# Patient Record
Sex: MALE | Race: WHITE | Employment: UNEMPLOYED | URBAN - METROPOLITAN AREA
[De-identification: names, ages, dates, MRNs, and addresses within clinical notes are randomized per-mention and may not be internally consistent; named-entity substitution may affect disease eponyms.]

---

## 2017-09-20 ENCOUNTER — OFFICE VISIT (OUTPATIENT)
Dept: ORTHOPEDIC SURGERY | Age: 42
End: 2017-09-20

## 2017-09-20 VITALS
WEIGHT: 205 LBS | BODY MASS INDEX: 27.77 KG/M2 | HEART RATE: 63 BPM | SYSTOLIC BLOOD PRESSURE: 140 MMHG | DIASTOLIC BLOOD PRESSURE: 85 MMHG | HEIGHT: 72 IN

## 2017-09-20 DIAGNOSIS — M22.41 CHONDROMALACIA PATELLA, RIGHT: ICD-10-CM

## 2017-09-20 DIAGNOSIS — M25.561 RIGHT KNEE PAIN, UNSPECIFIED CHRONICITY: Primary | ICD-10-CM

## 2017-09-20 DIAGNOSIS — Z98.890 S/P ARTHROSCOPY OF KNEE: ICD-10-CM

## 2017-09-20 DIAGNOSIS — M17.10 LOCALIZED OSTEOARTHROSIS, LOWER LEG: ICD-10-CM

## 2017-09-20 PROCEDURE — 73562 X-RAY EXAM OF KNEE 3: CPT | Performed by: ORTHOPAEDIC SURGERY

## 2017-09-20 PROCEDURE — 20611 DRAIN/INJ JOINT/BURSA W/US: CPT | Performed by: ORTHOPAEDIC SURGERY

## 2017-09-20 PROCEDURE — 99999 PR OFFICE/OUTPT VISIT,PROCEDURE ONLY: CPT | Performed by: ORTHOPAEDIC SURGERY

## 2017-09-22 ENCOUNTER — TELEPHONE (OUTPATIENT)
Dept: ORTHOPEDIC SURGERY | Age: 42
End: 2017-09-22

## 2017-10-25 ENCOUNTER — NURSE ONLY (OUTPATIENT)
Dept: ORTHOPEDIC SURGERY | Age: 42
End: 2017-10-25

## 2017-10-25 DIAGNOSIS — M17.11 PRIMARY OSTEOARTHRITIS OF RIGHT KNEE: Primary | ICD-10-CM

## 2017-10-25 PROCEDURE — 99999 PR OFFICE/OUTPT VISIT,PROCEDURE ONLY: CPT | Performed by: PHYSICIAN ASSISTANT

## 2017-10-25 PROCEDURE — 20611 DRAIN/INJ JOINT/BURSA W/US: CPT | Performed by: PHYSICIAN ASSISTANT

## 2017-10-25 NOTE — PROGRESS NOTES
The patient is symptomatic from osteoarthritis of the right knee joint with documented radiological signs of osteoarthritis. The patient has also failed 3 months of conservative treatment including home exercise, education, Tylenol and/or NSAIDs use. The patient was offered a Visco supplementation today. Risks, benefits, and alternatives to the injections were discussed in detail with the patient. The risks discussed included but are not limited to infection, skin reactions, hot swollen joints, and anaphylaxis. The patient gave verbal informed consent for the injection. The patient's skin was prepped with  3 sterile gauze  pads soaked with alcohol solution and the knee joint was injected with 2cc Euflexxa intra-articularly under sterile conditions. Technique: Under sterile conditions a Son[employer] ultrasound unit with a variable frequency (6.0-15.0 MHz) linear transducer was used to localize the placement of a 22-gauge needle into the right knee joint. Findings: Successful needle placement for intra-articular Visco supplementation injection. Final images were taken and saved for permanent record. The patient tolerated the injection reasonably well. The patient was given instructions to ice the knee and avoid strenuous activities for 24-48 hours. The patient was instructed to call the office immediately if there is increased pain, redness, warmth, fever, or chills. We will see the patient back in one week for their second injection. Karlee [facility]    This dictation was performed with a verbal recognition program [facility] ) and it was checked for errors. It is possible that there are still dictated errors within this office note. If so, please bring any errors to my attention for an addendum. All efforts were made to ensure that this office note is accurate.

## 2017-10-25 NOTE — PROGRESS NOTES
Vidya Bone #1     [address]: [id number]    LOT #: [id number]    EXP: 10/27/18    SITE: right knee

## 2017-11-01 ENCOUNTER — OFFICE VISIT (OUTPATIENT)
Dept: ORTHOPEDIC SURGERY | Age: 42
End: 2017-11-01

## 2017-11-01 DIAGNOSIS — M17.11 PRIMARY OSTEOARTHRITIS OF RIGHT KNEE: Primary | ICD-10-CM

## 2017-11-01 PROCEDURE — 20611 DRAIN/INJ JOINT/BURSA W/US: CPT | Performed by: PHYSICIAN ASSISTANT

## 2017-11-01 PROCEDURE — 99999 PR OFFICE/OUTPT VISIT,PROCEDURE ONLY: CPT | Performed by: PHYSICIAN ASSISTANT

## 2017-11-01 NOTE — PROGRESS NOTES
Euflexxa #2 RIGHT knee  Osteoarthritis RIGHT knee    The patient returns today for their second Euflexxa for the treatment of osteoarthritis. The risks, benefits, and complications of the injections were again discussed in detail with the patient. The risks discussed included but are not limited to infection, skin reactions, hot swollen joints, and anaphylaxis. The patient gave verbal informed consent for the injection. The patient skin was prepped with 3 sterile gauze pads soaked with alcohol solution and the knee joint was injected with 2cc Euflexxa to the Right knee intra-articularly under sterile conditions . Technique: Under sterile conditions a [employer] ultrasound unit with a variable frequency (6.0-15.0 MHz) linear transducer was used to localize the placement of a 22-gauge needle into the alana joint. Findings: Successful needle placement for intra-articular Visco supplementation injection. Final images were taken and saved for permanent record. The patient tolerated the injection reasonably well. The patient was given instructions to ice the the knee and avoid strenuous activities for 24-48 hours. The patient was instructed to call the office immediately if there is increased pain, redness, warmth, fever, or chills. We will see the patient back in one week for the third injection.

## 2017-11-01 NOTE — PROGRESS NOTES
SITE: #2 [address] [place] [address] RT KNEE     UTT:[id number]    [facility]#:[id number]    FHL:[id number]/8861

## 2017-11-08 ENCOUNTER — NURSE ONLY (OUTPATIENT)
Dept: ORTHOPEDIC SURGERY | Age: 42
End: 2017-11-08

## 2017-11-08 DIAGNOSIS — M17.11 PRIMARY OSTEOARTHRITIS OF RIGHT KNEE: Primary | ICD-10-CM

## 2017-11-08 PROCEDURE — 20611 DRAIN/INJ JOINT/BURSA W/US: CPT | Performed by: PHYSICIAN ASSISTANT

## 2017-11-08 NOTE — PROGRESS NOTES
Euflexxa #3 RIGHT knee  The patient returns today for their third and final Euflexxa for the treatment of osteoarthritis. The risks, benefits, and complications of the injections were again discussed in detail with the patient. The risks discussed include but are not limited to infection, skin reactions, hot swollen joints, and anaphylaxis. The patient gave verbal informed consent for the injection. The patient's skin was prepped with 3 sterile gauze pads soaked with alcohol solution and the knee joint was injected with 2cc Euflexxa to the Right knee intra-articularly under sterile conditions. Technique: Under sterile conditions a [employer] ultrasound unit with a variable frequency (6.0-15.0 MHz) linear transducer was used to localize the placement of a 22-gauge needle into the knee joint. Findings: Successful needle placement for intra-articular Visco supplementation injection. Final images were taken and saved for permanent record. The patient tolerated the injection reasonably well. The patient was given instructions to ice of the knee and avoid strenuous activity for 24-48 hours. The patient was instructed to call the office immediately if there is increased pain, redness, warmth, fever, or chills. We will see the patient back on an as-needed basis  from this point.

## 2018-04-03 ENCOUNTER — OFFICE VISIT (OUTPATIENT)
Dept: URGENT CARE | Age: 43
End: 2018-04-03

## 2018-04-03 VITALS
WEIGHT: 210 LBS | BODY MASS INDEX: 28.44 KG/M2 | TEMPERATURE: 97.7 F | SYSTOLIC BLOOD PRESSURE: 170 MMHG | HEIGHT: 72 IN | DIASTOLIC BLOOD PRESSURE: 118 MMHG | HEART RATE: 84 BPM | RESPIRATION RATE: 20 BRPM

## 2018-04-03 DIAGNOSIS — R51.9 ACUTE INTRACTABLE HEADACHE, UNSPECIFIED HEADACHE TYPE: Primary | ICD-10-CM

## 2018-04-03 DIAGNOSIS — I10 HYPERTENSION, UNSPECIFIED TYPE: ICD-10-CM

## 2018-04-03 PROCEDURE — 99204 OFFICE O/P NEW MOD 45 MIN: CPT | Performed by: EMERGENCY MEDICINE

## 2018-04-03 RX ORDER — FENOFIBRATE 160 MG/1
160 TABLET ORAL DAILY
COMMUNITY
Start: 2018-03-30

## 2018-04-03 RX ORDER — MONTELUKAST SODIUM 10 MG/1
TABLET ORAL
Refills: 1 | COMMUNITY
Start: 2018-03-03

## 2018-04-03 RX ORDER — IRBESARTAN 150 MG/1
TABLET ORAL
Refills: 1 | COMMUNITY
Start: 2018-03-03

## 2018-04-03 RX ORDER — NALTREXONE HYDROCHLORIDE 50 MG/1
50 TABLET, FILM COATED ORAL DAILY
COMMUNITY
End: 2019-05-08

## 2018-04-03 ASSESSMENT — ENCOUNTER SYMPTOMS
ABDOMINAL PAIN: 1
VOMITING: 1
NAUSEA: 1

## 2019-05-08 ENCOUNTER — HOSPITAL ENCOUNTER (INPATIENT)
Age: 44
LOS: 1 days | Discharge: PSYCHIATRIC OR SUBSTANCE USE FACILITY | DRG: 897 | End: 2019-05-09
Attending: EMERGENCY MEDICINE | Admitting: HOSPITALIST
Payer: COMMERCIAL

## 2019-05-08 DIAGNOSIS — R45.851 SUICIDAL IDEATION: ICD-10-CM

## 2019-05-08 DIAGNOSIS — F10.920 ACUTE ALCOHOLIC INTOXICATION WITHOUT COMPLICATION (HCC): Primary | ICD-10-CM

## 2019-05-08 DIAGNOSIS — R09.02 HYPOXIA: ICD-10-CM

## 2019-05-08 PROBLEM — F10.10 ETOH ABUSE: Status: ACTIVE | Noted: 2019-05-08

## 2019-05-08 LAB
A/G RATIO: 2.3 (ref 1.1–2.2)
ACETAMINOPHEN LEVEL: <5 UG/ML (ref 10–30)
ALBUMIN SERPL-MCNC: 5 G/DL (ref 3.4–5)
ALP BLD-CCNC: 31 U/L (ref 40–129)
ALT SERPL-CCNC: 46 U/L (ref 10–40)
AMPHETAMINE SCREEN, URINE: NORMAL
ANION GAP SERPL CALCULATED.3IONS-SCNC: 13 MMOL/L (ref 3–16)
AST SERPL-CCNC: 45 U/L (ref 15–37)
BARBITURATE SCREEN URINE: NORMAL
BASOPHILS ABSOLUTE: 0.1 K/UL (ref 0–0.2)
BASOPHILS RELATIVE PERCENT: 0.7 %
BENZODIAZEPINE SCREEN, URINE: NORMAL
BILIRUB SERPL-MCNC: 0.3 MG/DL (ref 0–1)
BILIRUBIN URINE: NEGATIVE
BLOOD, URINE: NEGATIVE
BUN BLDV-MCNC: 8 MG/DL (ref 7–20)
CALCIUM SERPL-MCNC: 9 MG/DL (ref 8.3–10.6)
CANNABINOID SCREEN URINE: NORMAL
CHLORIDE BLD-SCNC: 108 MMOL/L (ref 99–110)
CLARITY: CLEAR
CO2: 22 MMOL/L (ref 21–32)
COCAINE METABOLITE SCREEN URINE: NORMAL
COLOR: YELLOW
CREAT SERPL-MCNC: 0.9 MG/DL (ref 0.9–1.3)
EOSINOPHILS ABSOLUTE: 0.1 K/UL (ref 0–0.6)
EOSINOPHILS RELATIVE PERCENT: 1.2 %
ETHANOL: 250 MG/DL (ref 0–0.08)
GFR AFRICAN AMERICAN: >60
GFR NON-AFRICAN AMERICAN: >60
GLOBULIN: 2.2 G/DL
GLUCOSE BLD-MCNC: 114 MG/DL (ref 70–99)
GLUCOSE URINE: NEGATIVE MG/DL
HCT VFR BLD CALC: 44.3 % (ref 40.5–52.5)
HEMOGLOBIN: 15.2 G/DL (ref 13.5–17.5)
INR BLD: 0.98 (ref 0.86–1.14)
KETONES, URINE: NEGATIVE MG/DL
LEUKOCYTE ESTERASE, URINE: NEGATIVE
LYMPHOCYTES ABSOLUTE: 3.4 K/UL (ref 1–5.1)
LYMPHOCYTES RELATIVE PERCENT: 47.3 %
Lab: NORMAL
MCH RBC QN AUTO: 31.7 PG (ref 26–34)
MCHC RBC AUTO-ENTMCNC: 34.2 G/DL (ref 31–36)
MCV RBC AUTO: 92.5 FL (ref 80–100)
METHADONE SCREEN, URINE: NORMAL
MICROSCOPIC EXAMINATION: NORMAL
MONOCYTES ABSOLUTE: 0.4 K/UL (ref 0–1.3)
MONOCYTES RELATIVE PERCENT: 5.9 %
NEUTROPHILS ABSOLUTE: 3.2 K/UL (ref 1.7–7.7)
NEUTROPHILS RELATIVE PERCENT: 44.9 %
NITRITE, URINE: NEGATIVE
OPIATE SCREEN URINE: NORMAL
OXYCODONE URINE: NORMAL
PDW BLD-RTO: 14.1 % (ref 12.4–15.4)
PH UA: 5.5
PH UA: 5.5 (ref 5–8)
PHENCYCLIDINE SCREEN URINE: NORMAL
PLATELET # BLD: 292 K/UL (ref 135–450)
PMV BLD AUTO: 7.5 FL (ref 5–10.5)
POTASSIUM REFLEX MAGNESIUM: 4 MMOL/L (ref 3.5–5.1)
PROPOXYPHENE SCREEN: NORMAL
PROTEIN UA: NEGATIVE MG/DL
PROTHROMBIN TIME: 11.2 SEC (ref 9.8–13)
RBC # BLD: 4.79 M/UL (ref 4.2–5.9)
SALICYLATE, SERUM: <0.3 MG/DL (ref 15–30)
SODIUM BLD-SCNC: 143 MMOL/L (ref 136–145)
SPECIFIC GRAVITY UA: <=1.005 (ref 1–1.03)
TOTAL PROTEIN: 7.2 G/DL (ref 6.4–8.2)
URINE REFLEX TO CULTURE: NORMAL
URINE TYPE: NORMAL
UROBILINOGEN, URINE: 0.2 E.U./DL
WBC # BLD: 7.2 K/UL (ref 4–11)

## 2019-05-08 PROCEDURE — G0480 DRUG TEST DEF 1-7 CLASSES: HCPCS

## 2019-05-08 PROCEDURE — 85610 PROTHROMBIN TIME: CPT

## 2019-05-08 PROCEDURE — 6360000002 HC RX W HCPCS: Performed by: NURSE PRACTITIONER

## 2019-05-08 PROCEDURE — 80307 DRUG TEST PRSMV CHEM ANLYZR: CPT

## 2019-05-08 PROCEDURE — 2500000003 HC RX 250 WO HCPCS: Performed by: NURSE PRACTITIONER

## 2019-05-08 PROCEDURE — 80053 COMPREHEN METABOLIC PANEL: CPT

## 2019-05-08 PROCEDURE — 85025 COMPLETE CBC W/AUTO DIFF WBC: CPT

## 2019-05-08 PROCEDURE — 99283 EMERGENCY DEPT VISIT LOW MDM: CPT

## 2019-05-08 PROCEDURE — 1200000000 HC SEMI PRIVATE

## 2019-05-08 PROCEDURE — 81003 URINALYSIS AUTO W/O SCOPE: CPT

## 2019-05-08 PROCEDURE — 2580000003 HC RX 258: Performed by: NURSE PRACTITIONER

## 2019-05-08 PROCEDURE — 96365 THER/PROPH/DIAG IV INF INIT: CPT

## 2019-05-08 RX ORDER — 0.9 % SODIUM CHLORIDE 0.9 %
1000 INTRAVENOUS SOLUTION INTRAVENOUS ONCE
Status: DISCONTINUED | OUTPATIENT
Start: 2019-05-08 | End: 2019-05-08

## 2019-05-08 RX ADMIN — THIAMINE HYDROCHLORIDE: 100 INJECTION, SOLUTION INTRAMUSCULAR; INTRAVENOUS at 23:16

## 2019-05-08 ASSESSMENT — ENCOUNTER SYMPTOMS
ABDOMINAL PAIN: 0
SHORTNESS OF BREATH: 0

## 2019-05-09 ENCOUNTER — HOSPITAL ENCOUNTER (INPATIENT)
Age: 44
LOS: 3 days | Discharge: HOME | DRG: 885 | End: 2019-05-12
Attending: PSYCHIATRY & NEUROLOGY | Admitting: PSYCHIATRY & NEUROLOGY
Payer: COMMERCIAL

## 2019-05-09 VITALS
OXYGEN SATURATION: 98 % | TEMPERATURE: 98.4 F | SYSTOLIC BLOOD PRESSURE: 173 MMHG | BODY MASS INDEX: 29.76 KG/M2 | HEIGHT: 72 IN | DIASTOLIC BLOOD PRESSURE: 87 MMHG | RESPIRATION RATE: 17 BRPM | HEART RATE: 97 BPM | WEIGHT: 219.7 LBS

## 2019-05-09 DIAGNOSIS — F33.2 MDD (MAJOR DEPRESSIVE DISORDER), RECURRENT SEVERE, WITHOUT PSYCHOSIS (HCC): ICD-10-CM

## 2019-05-09 PROBLEM — F10.920 ALCOHOLIC INTOXICATION WITHOUT COMPLICATION (HCC): Status: ACTIVE | Noted: 2019-05-09

## 2019-05-09 PROBLEM — F10.94 ALCOHOL-INDUCED MOOD DISORDER (HCC): Status: ACTIVE | Noted: 2019-05-09

## 2019-05-09 LAB
A/G RATIO: 2.1 (ref 1.1–2.2)
ALBUMIN SERPL-MCNC: 4.2 G/DL (ref 3.4–5)
ALP BLD-CCNC: 27 U/L (ref 40–129)
ALT SERPL-CCNC: 41 U/L (ref 10–40)
ANION GAP SERPL CALCULATED.3IONS-SCNC: 10 MMOL/L (ref 3–16)
AST SERPL-CCNC: 38 U/L (ref 15–37)
BASOPHILS ABSOLUTE: 0 K/UL (ref 0–0.2)
BASOPHILS RELATIVE PERCENT: 0.7 %
BILIRUB SERPL-MCNC: 0.3 MG/DL (ref 0–1)
BILIRUBIN DIRECT: <0.2 MG/DL (ref 0–0.3)
BILIRUBIN, INDIRECT: NORMAL MG/DL (ref 0–1)
BUN BLDV-MCNC: 9 MG/DL (ref 7–20)
CALCIUM SERPL-MCNC: 8.1 MG/DL (ref 8.3–10.6)
CHLORIDE BLD-SCNC: 110 MMOL/L (ref 99–110)
CO2: 22 MMOL/L (ref 21–32)
CREAT SERPL-MCNC: 1 MG/DL (ref 0.9–1.3)
EOSINOPHILS ABSOLUTE: 0.1 K/UL (ref 0–0.6)
EOSINOPHILS RELATIVE PERCENT: 1.4 %
ETHANOL: NORMAL MG/DL (ref 0–0.08)
GFR AFRICAN AMERICAN: >60
GFR NON-AFRICAN AMERICAN: >60
GLOBULIN: 2 G/DL
GLUCOSE BLD-MCNC: 101 MG/DL (ref 70–99)
HCT VFR BLD CALC: 38.8 % (ref 40.5–52.5)
HEMOGLOBIN: 13.5 G/DL (ref 13.5–17.5)
LYMPHOCYTES ABSOLUTE: 2.4 K/UL (ref 1–5.1)
LYMPHOCYTES RELATIVE PERCENT: 47.6 %
MCH RBC QN AUTO: 32 PG (ref 26–34)
MCHC RBC AUTO-ENTMCNC: 34.8 G/DL (ref 31–36)
MCV RBC AUTO: 92 FL (ref 80–100)
MONOCYTES ABSOLUTE: 0.3 K/UL (ref 0–1.3)
MONOCYTES RELATIVE PERCENT: 5.7 %
NEUTROPHILS ABSOLUTE: 2.3 K/UL (ref 1.7–7.7)
NEUTROPHILS RELATIVE PERCENT: 44.6 %
PDW BLD-RTO: 13.5 % (ref 12.4–15.4)
PLATELET # BLD: 233 K/UL (ref 135–450)
PMV BLD AUTO: 7.4 FL (ref 5–10.5)
POTASSIUM REFLEX MAGNESIUM: 3.7 MMOL/L (ref 3.5–5.1)
RBC # BLD: 4.22 M/UL (ref 4.2–5.9)
SODIUM BLD-SCNC: 142 MMOL/L (ref 136–145)
TOTAL PROTEIN: 6.2 G/DL (ref 6.4–8.2)
WBC # BLD: 5.1 K/UL (ref 4–11)

## 2019-05-09 PROCEDURE — 6370000000 HC RX 637 (ALT 250 FOR IP): Performed by: PSYCHIATRY & NEUROLOGY

## 2019-05-09 PROCEDURE — 85025 COMPLETE CBC W/AUTO DIFF WBC: CPT

## 2019-05-09 PROCEDURE — G0480 DRUG TEST DEF 1-7 CLASSES: HCPCS

## 2019-05-09 PROCEDURE — 99255 IP/OBS CONSLTJ NEW/EST HI 80: CPT | Performed by: PSYCHIATRY & NEUROLOGY

## 2019-05-09 PROCEDURE — 2500000003 HC RX 250 WO HCPCS: Performed by: HOSPITALIST

## 2019-05-09 PROCEDURE — 80053 COMPREHEN METABOLIC PANEL: CPT

## 2019-05-09 PROCEDURE — 36415 COLL VENOUS BLD VENIPUNCTURE: CPT

## 2019-05-09 PROCEDURE — 6360000002 HC RX W HCPCS: Performed by: HOSPITALIST

## 2019-05-09 PROCEDURE — 1240000000 HC EMOTIONAL WELLNESS R&B

## 2019-05-09 PROCEDURE — 6370000000 HC RX 637 (ALT 250 FOR IP): Performed by: HOSPITALIST

## 2019-05-09 PROCEDURE — 2580000003 HC RX 258: Performed by: HOSPITALIST

## 2019-05-09 PROCEDURE — 6370000000 HC RX 637 (ALT 250 FOR IP): Performed by: PHYSICIAN ASSISTANT

## 2019-05-09 RX ORDER — FLUOXETINE HYDROCHLORIDE 20 MG/1
20 CAPSULE ORAL DAILY
Status: DISCONTINUED | OUTPATIENT
Start: 2019-05-10 | End: 2019-05-12 | Stop reason: HOSPADM

## 2019-05-09 RX ORDER — M-VIT,TX,IRON,MINS/CALC/FOLIC 27MG-0.4MG
1 TABLET ORAL DAILY
Status: DISCONTINUED | OUTPATIENT
Start: 2019-05-10 | End: 2019-05-12 | Stop reason: HOSPADM

## 2019-05-09 RX ORDER — LORAZEPAM 2 MG/1
2 TABLET ORAL
Status: DISCONTINUED | OUTPATIENT
Start: 2019-05-09 | End: 2019-05-09 | Stop reason: HOSPADM

## 2019-05-09 RX ORDER — ACETAMINOPHEN 325 MG/1
650 TABLET ORAL EVERY 4 HOURS PRN
Status: DISCONTINUED | OUTPATIENT
Start: 2019-05-09 | End: 2019-05-12 | Stop reason: HOSPADM

## 2019-05-09 RX ORDER — CLONIDINE HYDROCHLORIDE 0.1 MG/1
0.1 TABLET ORAL ONCE
Status: COMPLETED | OUTPATIENT
Start: 2019-05-09 | End: 2019-05-09

## 2019-05-09 RX ORDER — HALOPERIDOL 5 MG/ML
5 INJECTION INTRAMUSCULAR EVERY 6 HOURS PRN
Status: DISCONTINUED | OUTPATIENT
Start: 2019-05-09 | End: 2019-05-12 | Stop reason: HOSPADM

## 2019-05-09 RX ORDER — LORAZEPAM 2 MG/1
4 TABLET ORAL
Status: DISCONTINUED | OUTPATIENT
Start: 2019-05-09 | End: 2019-05-09 | Stop reason: HOSPADM

## 2019-05-09 RX ORDER — MONTELUKAST SODIUM 10 MG/1
10 TABLET ORAL NIGHTLY
Status: DISCONTINUED | OUTPATIENT
Start: 2019-05-09 | End: 2019-05-09 | Stop reason: HOSPADM

## 2019-05-09 RX ORDER — LORAZEPAM 2 MG/ML
3 INJECTION INTRAMUSCULAR
Status: DISCONTINUED | OUTPATIENT
Start: 2019-05-09 | End: 2019-05-09 | Stop reason: HOSPADM

## 2019-05-09 RX ORDER — ONDANSETRON 2 MG/ML
4 INJECTION INTRAMUSCULAR; INTRAVENOUS EVERY 6 HOURS PRN
Status: DISCONTINUED | OUTPATIENT
Start: 2019-05-09 | End: 2019-05-09 | Stop reason: HOSPADM

## 2019-05-09 RX ORDER — TRAZODONE HYDROCHLORIDE 50 MG/1
50 TABLET ORAL NIGHTLY PRN
Status: DISCONTINUED | OUTPATIENT
Start: 2019-05-09 | End: 2019-05-12 | Stop reason: HOSPADM

## 2019-05-09 RX ORDER — LORAZEPAM 2 MG/1
4 TABLET ORAL
Status: DISCONTINUED | OUTPATIENT
Start: 2019-05-09 | End: 2019-05-12 | Stop reason: HOSPADM

## 2019-05-09 RX ORDER — SODIUM CHLORIDE 0.9 % (FLUSH) 0.9 %
10 SYRINGE (ML) INJECTION EVERY 12 HOURS SCHEDULED
Status: DISCONTINUED | OUTPATIENT
Start: 2019-05-09 | End: 2019-05-09 | Stop reason: HOSPADM

## 2019-05-09 RX ORDER — LORAZEPAM 2 MG/ML
2 INJECTION INTRAMUSCULAR
Status: DISCONTINUED | OUTPATIENT
Start: 2019-05-09 | End: 2019-05-09 | Stop reason: HOSPADM

## 2019-05-09 RX ORDER — MONTELUKAST SODIUM 10 MG/1
10 TABLET ORAL NIGHTLY
Status: DISCONTINUED | OUTPATIENT
Start: 2019-05-09 | End: 2019-05-12 | Stop reason: HOSPADM

## 2019-05-09 RX ORDER — LORAZEPAM 1 MG/1
1 TABLET ORAL
Status: DISCONTINUED | OUTPATIENT
Start: 2019-05-09 | End: 2019-05-12 | Stop reason: HOSPADM

## 2019-05-09 RX ORDER — FLUOXETINE HYDROCHLORIDE 20 MG/1
20 CAPSULE ORAL DAILY
Status: DISCONTINUED | OUTPATIENT
Start: 2019-05-09 | End: 2019-05-09 | Stop reason: HOSPADM

## 2019-05-09 RX ORDER — FENOFIBRATE 160 MG/1
160 TABLET ORAL DAILY
Status: DISCONTINUED | OUTPATIENT
Start: 2019-05-10 | End: 2019-05-12 | Stop reason: HOSPADM

## 2019-05-09 RX ORDER — SODIUM CHLORIDE 0.9 % (FLUSH) 0.9 %
10 SYRINGE (ML) INJECTION PRN
Status: DISCONTINUED | OUTPATIENT
Start: 2019-05-09 | End: 2019-05-09 | Stop reason: HOSPADM

## 2019-05-09 RX ORDER — LORAZEPAM 1 MG/1
1 TABLET ORAL
Status: DISCONTINUED | OUTPATIENT
Start: 2019-05-09 | End: 2019-05-09 | Stop reason: HOSPADM

## 2019-05-09 RX ORDER — HYDROXYZINE PAMOATE 50 MG/1
50 CAPSULE ORAL 3 TIMES DAILY PRN
Status: DISCONTINUED | OUTPATIENT
Start: 2019-05-09 | End: 2019-05-12 | Stop reason: HOSPADM

## 2019-05-09 RX ORDER — LORAZEPAM 2 MG/ML
1 INJECTION INTRAMUSCULAR
Status: DISCONTINUED | OUTPATIENT
Start: 2019-05-09 | End: 2019-05-09 | Stop reason: HOSPADM

## 2019-05-09 RX ORDER — ONDANSETRON 4 MG/1
4 TABLET, ORALLY DISINTEGRATING ORAL EVERY 8 HOURS PRN
Status: DISCONTINUED | OUTPATIENT
Start: 2019-05-09 | End: 2019-05-12 | Stop reason: HOSPADM

## 2019-05-09 RX ORDER — SODIUM CHLORIDE 9 MG/ML
INJECTION, SOLUTION INTRAVENOUS CONTINUOUS
Status: DISCONTINUED | OUTPATIENT
Start: 2019-05-09 | End: 2019-05-09 | Stop reason: HOSPADM

## 2019-05-09 RX ORDER — IBUPROFEN 400 MG/1
400 TABLET ORAL EVERY 6 HOURS PRN
Status: DISCONTINUED | OUTPATIENT
Start: 2019-05-09 | End: 2019-05-12 | Stop reason: HOSPADM

## 2019-05-09 RX ORDER — FOLIC ACID 1 MG/1
1 TABLET ORAL DAILY
Status: DISCONTINUED | OUTPATIENT
Start: 2019-05-10 | End: 2019-05-12 | Stop reason: HOSPADM

## 2019-05-09 RX ORDER — LOSARTAN POTASSIUM 25 MG/1
50 TABLET ORAL DAILY
Status: DISCONTINUED | OUTPATIENT
Start: 2019-05-09 | End: 2019-05-09 | Stop reason: HOSPADM

## 2019-05-09 RX ORDER — THIAMINE MONONITRATE (VIT B1) 100 MG
100 TABLET ORAL DAILY
Status: DISCONTINUED | OUTPATIENT
Start: 2019-05-10 | End: 2019-05-12 | Stop reason: HOSPADM

## 2019-05-09 RX ORDER — LORAZEPAM 2 MG/1
2 TABLET ORAL
Status: DISCONTINUED | OUTPATIENT
Start: 2019-05-09 | End: 2019-05-12 | Stop reason: HOSPADM

## 2019-05-09 RX ORDER — LOSARTAN POTASSIUM 25 MG/1
50 TABLET ORAL DAILY
Status: DISCONTINUED | OUTPATIENT
Start: 2019-05-10 | End: 2019-05-12 | Stop reason: HOSPADM

## 2019-05-09 RX ORDER — MAGNESIUM HYDROXIDE/ALUMINUM HYDROXICE/SIMETHICONE 120; 1200; 1200 MG/30ML; MG/30ML; MG/30ML
30 SUSPENSION ORAL EVERY 6 HOURS PRN
Status: DISCONTINUED | OUTPATIENT
Start: 2019-05-09 | End: 2019-05-12 | Stop reason: HOSPADM

## 2019-05-09 RX ORDER — LORAZEPAM 2 MG/ML
4 INJECTION INTRAMUSCULAR
Status: DISCONTINUED | OUTPATIENT
Start: 2019-05-09 | End: 2019-05-09 | Stop reason: HOSPADM

## 2019-05-09 RX ADMIN — HYDROXYZINE PAMOATE 50 MG: 50 CAPSULE ORAL at 22:31

## 2019-05-09 RX ADMIN — LOSARTAN POTASSIUM 50 MG: 25 TABLET, FILM COATED ORAL at 08:47

## 2019-05-09 RX ADMIN — SODIUM CHLORIDE: 9 INJECTION, SOLUTION INTRAVENOUS at 08:43

## 2019-05-09 RX ADMIN — LORAZEPAM 1 MG: 1 TABLET ORAL at 20:25

## 2019-05-09 RX ADMIN — TRAZODONE HYDROCHLORIDE 50 MG: 50 TABLET ORAL at 22:31

## 2019-05-09 RX ADMIN — FOLIC ACID: 5 INJECTION, SOLUTION INTRAMUSCULAR; INTRAVENOUS; SUBCUTANEOUS at 08:45

## 2019-05-09 RX ADMIN — SODIUM CHLORIDE: 9 INJECTION, SOLUTION INTRAVENOUS at 01:07

## 2019-05-09 RX ADMIN — LORAZEPAM 2 MG: 2 TABLET ORAL at 21:30

## 2019-05-09 RX ADMIN — Medication 10 ML: at 08:48

## 2019-05-09 RX ADMIN — CLONIDINE HYDROCHLORIDE 0.1 MG: 0.1 TABLET ORAL at 16:06

## 2019-05-09 RX ADMIN — CLONIDINE HYDROCHLORIDE 0.1 MG: 0.1 TABLET ORAL at 20:25

## 2019-05-09 RX ADMIN — MONTELUKAST SODIUM 10 MG: 10 TABLET, FILM COATED ORAL at 20:26

## 2019-05-09 RX ADMIN — FLUOXETINE 20 MG: 20 CAPSULE ORAL at 08:47

## 2019-05-09 ASSESSMENT — PAIN SCALES - GENERAL
PAINLEVEL_OUTOF10: 0
PAINLEVEL_OUTOF10: 0

## 2019-05-09 ASSESSMENT — SLEEP AND FATIGUE QUESTIONNAIRES
DO YOU USE A SLEEP AID: YES
DO YOU HAVE DIFFICULTY SLEEPING: YES
DIFFICULTY STAYING ASLEEP: YES
DIFFICULTY FALLING ASLEEP: YES
DIFFICULTY ARISING: NO
AVERAGE NUMBER OF SLEEP HOURS: 3
SLEEP PATTERN: DIFFICULTY FALLING ASLEEP;DISTURBED/INTERRUPTED SLEEP
RESTFUL SLEEP: NO

## 2019-05-09 ASSESSMENT — LIFESTYLE VARIABLES: HISTORY_ALCOHOL_USE: YES

## 2019-05-09 ASSESSMENT — PATIENT HEALTH QUESTIONNAIRE - PHQ9: SUM OF ALL RESPONSES TO PHQ QUESTIONS 1-9: 9

## 2019-05-09 NOTE — ED NOTES
Pt appears to have possible sleep apnea, pt with snoring while sleeping and has apnec periods that last no more then ten seconds pt with pulse ox dropping into 80's during these episodes pt then snores  loudly and then back to breathing normally with some snoring noted, this nurse awakes pt and pt denies any hx of sleep apnea.  Pt still easily arousable and able to answer questions appropriately      Dione Morgan RN  05/09/19 5928

## 2019-05-09 NOTE — ED TRIAGE NOTES
Chief Complaint   Patient presents with    Psychiatric Evaluation     pt arrives to room 11 via PD, PD states pt caled a friend and said he was suicidal, pd states pt told friend he has knife to his chest, pd states pt meet then at front door and was cooperative and smelled of etoh, pd states pt admitted to pd that he was going to harm himself, pt coopertive during triage    pt states took regular sleeping medication prior to coming to hospital

## 2019-05-09 NOTE — DISCHARGE INSTR - COC
Continuity of Care Form    Patient Name: Rodrigo Oppenheim   :  1975  MRN:  [id number]    Admit date:  2019  Discharge date:  ***    Code Status Order: Full Code   Advance Directives:   Advance Care Flowsheet Documentation     Date/Time Healthcare Directive Type of Healthcare Directive Copy in [address] Agent's Name Healthcare Agent's Phone Number    [phone]  No, patient does not have an advance directive for healthcare treatment -- -- -- -- --          Admitting Physician:  Lesa Buckley MD  PCP: Jessica Miller MD    Discharging Nurse: [facility] Unit/Room#: 1809/5366-29  Discharging Unit Phone Number: ***    Emergency Contact:   Extended Emergency Contact Information  Primary Emergency Contact: Masha Bentley  Address: [address] [place] [address] [place]  Phone: [phone]  Mobile Phone: [phone]  Relation: Spouse    Past Surgical History:  Past Surgical History:   Procedure Laterality Date    KNEE ARTHROSCOPY Right     WISDOM TOOTH EXTRACTION         Immunization History:   Immunization History   Administered Date(s) Administered    Influenza Virus Vaccine 10/01/2018       Active Problems:  Patient Active Problem List   Diagnosis Code    Tear of medial cartilage or meniscus of knee, current [id number]    S/P arthroscopy of knee Z98.890    Localized osteoarthrosis, lower leg M17.10    Primary osteoarthritis of right knee M17.11    ETOH abuse K01.05    Alcoholic intoxication without complication ([facility] P04.345    [facility] F10.94       Isolation/Infection:   Isolation          No Isolation            Nurse Assessment:  Last Vital Signs: BP (!) 155/88   Pulse 89   Temp 97.3 °F (36.3 °C) (Oral)   Resp 16   Ht 6' (1.829 m)   Wt 219 lb 11.2 oz (99.7 kg)   SpO2 98%   BMI 29.80 kg/m²     Last documented pain score (0-10 scale): Pain Level: 0  Last Weight:    Wt Readings from Last 1 Encounters:   19 219 lb 11.2 oz (99.7 kg)     Mental Status:  {IP PT MENTAL STATUS:91984}    IV Access:  { SAL IV ACCESS:752306154}    Nursing Mobility/ADLs:  Walking   {CHP DME SCQ}  Transfer  {CHP DME RPQL:187247993}  Bathing  {CHP DME KAON:747366133}  Dressing  {CHP DME AEJE:169955423}  Toileting  {CHP DME EWZU:277994807}  Feeding  {CHP DME YVCY:192762976}  Med Admin  {CHP DME YJVW:850532242}  Med Delivery   { SAL MED Delivery:452720808}    Wound Care Documentation and Therapy:        Elimination:  Continence:   · Bowel: {YES / EX:45234}  · Bladder: {YES / QD:20331}  Urinary Catheter: {Urinary Catheter:124550274}   Colostomy/Ileostomy/Ileal Conduit: {YES / BE:15174}       Date of Last BM: ***    Intake/Output Summary (Last 24 hours) at 2019 1239  Last data filed at 2019 1210  Gross per 24 hour   Intake 1230 ml   Output --   Net 1230 ml     I/O last 3 completed shifts:   In: 18 [I.V.:990]  Out: -     Safety Concerns:     [facility] [employer] Safety Concerns:073669673}    Impairments/Disabilities:      [facility] [facility] Impairments/Disabilities:865529175}    Nutrition Therapy:  Current Nutrition Therapy:   [facility] [employer] Diet List:333632764}    Routes of Feeding: {CHP DME Other Feedings:573969448}  Liquids: {Slp liquid thickness:82953}  Daily Fluid Restriction: {CHP DME Yes amt example:248376703}  Last Modified Barium Swallow with Video (Video Swallowing Test): {Done Not Done DPJC:937542346}    Treatments at the Time of Hospital Discharge:   Respiratory Treatments: ***  Oxygen Therapy:  {Therapy; copd oxygen:49578}  Ventilator:    { CC Vent TLKD:733919262}    Rehab Therapies: {THERAPEUTIC INTERVENTION:7772654130}  Weight Bearing Status/Restrictions: [address] [employer] Weight Bearin}  Other Medical Equipment (for information only, NOT a DME order):  {EQUIPMENT:856535814}  Other Treatments: ***    Patient's personal belongings (please select all that are sent with patient):  {[facility] DME

## 2019-05-09 NOTE — ED NOTES
Report given to Felicity Gilford RN at this time, Felicity Gilford to transport pt to unit via cot     Marylynn Schirmer, RN  05/09/19 0028

## 2019-05-09 NOTE — ED PROVIDER NOTES
[address] ED  eMERGENCY dEPARTMENT eNCOUnter        Pt Name: Dinah Cabrera  MRN: [id number]  [place] 1975  Date of evaluation: 5/8/2019  Provider: MARÍA ELENA Monroy Mai - DESIRAE  PCP: Kim Sagastume MD  ED Attending: Erin att. providers found    [facility]       Chief Complaint   Patient presents with    Psychiatric Evaluation     pt arrives to room 11 via PD, PD states pt caled a friend and said he was suicidal, pd states pt told friend he has knife to his chest, pd states pt meet then at front door and was cooperative and smelled of etoh, pd states pt admitted to pd that he was going to harm himself, pt coopertive during triage       HISTORY OF PRESENT ILLNESS   (Location/Symptom, Timing/Onset, Context/Setting, Quality, Duration, Modifying Factors, Severity)  Note limiting factors. Dinah Cabrera is a 44 y.o. male for  si. Onset was \"awhile\". Duration has been since the onset. Context includes pt states he wants to hurt himself. He does drink alcohol and last drank 1 hour prior to arrival. Alleviating factors include nothing. Aggravating factors include nothing. Pain is 0/10. nothing has been used for pain today. Pt states he drinks 4 beers per day. Nursing Notes were all reviewed and agreed with or any disagreements were addressed  in the HPI. REVIEW OF SYSTEMS  (2-9 systems for level 4, 10 or more for level 5)     Review of Systems   Constitutional: Negative for fever. Respiratory: Negative for shortness of breath. Cardiovascular: Negative for chest pain. Gastrointestinal: Negative for abdominal pain. Genitourinary: Negative for difficulty urinating. Psychiatric/Behavioral: Positive for suicidal ideas. All other systems reviewed and are negative. Positivesand Pertinent negatives as per HPI. Except as noted above in the ROS, all other systems were reviewed and negative.        PAST MEDICAL HISTORY     Past Medical History:   Diagnosis Date    Anxiety Narrative    None       SCREENINGS    [place] Coma Scale  Eye Opening: Spontaneous  Best Verbal Response: Oriented  Best Motor Response: Obeys commands  [place] Coma Scale Score: 15        PHYSICAL EXAM    (up to 7 for level 4, 8 ormore for level 5)     ED Triage Vitals   BP Temp Temp src Pulse Resp SpO2 Height Weight   -- -- -- -- -- -- -- --       Physical Exam   Constitutional: He is oriented to person, place, and time. He appears well-developed and well-nourished. HENT:   Head: Normocephalic and atraumatic. Neck: Normal range of motion. Cardiovascular: Normal rate. Pulmonary/Chest: Effort normal. No respiratory distress. Abdominal: Soft. He exhibits no distension. Musculoskeletal: Normal range of motion. Neurological: He is alert and oriented to person, place, and time. Skin: Skin is warm and dry. Psychiatric: His affect is labile. He expresses suicidal ideation. He expresses no homicidal ideation. He expresses no suicidal plans and no homicidal plans.        DIAGNOSTIC RESULTS   LABS:    Labs Reviewed   COMPREHENSIVE METABOLIC PANEL W/ REFLEX TO MG FOR LOW K - Abnormal; Notable for the following components:       Result Value    Glucose 114 (*)     Albumin/Globulin Ratio 2.3 (*)     Alkaline Phosphatase 31 (*)     ALT 46 (*)     AST 45 (*)     All other components within normal limits    Narrative:     Performed at:  [facility],  [place], [facility]   Phone [phone]   ACETAMINOPHEN LEVEL - Abnormal; Notable for the following components:    Acetaminophen Level <5 (*)     All other components within normal limits    Narrative:     Performed at:  [facility],  [place], [facility]   Phone [phone]   SALICYLATE LEVEL - Abnormal; Notable for the following components:    Salicylate, Serum <0.0 (*)     All other components within normal limits    Narrative:     Performed at:  [facility]) [address]   Phone [phone]   CBC WITH AUTO DIFFERENTIAL    Narrative:     Performed at:  [facility]) - [address]   Phone [phone]   URINE RT REFLEX TO CULTURE    Narrative:     Performed at:  [address]   Phone [phone]   URINE DRUG SCREEN    Narrative:     Performed at:  [address]   Phone [phone]   ETHANOL    Narrative:     Performed at:  [address]   Phone [phone]   PROTIME-INR    Narrative:     Performed at:  [facility]) - [address]   Phone [phone]       All other labs were within normal range or notreturned as of this dictation. EKG: All EKG's are interpreted by the Emergency Department Physician who either signs or Co-signs this chart in the absence of a cardiologist.  Please see their note for interpretation of EKG. RADIOLOGY:         Interpretation per the Radiologist below, if available at the time of this note:    No orders to display     No results found.       PROCEDURES   Unless otherwise noted below, none     Procedures    CRITICAL CARE TIME   N/A    CONSULTS:  IP CONSULT TO HOSPITALIST      EMERGENCY DEPARTMENT COURSE and DIFFERENTIAL DIAGNOSIS/MDM:   Vitals:    Vitals:    05/08/19 2245   BP: (!) 131/91   Pulse: 100   Resp: 16   Temp: 97.7 °F (36.5 °C)   TempSrc: Oral   SpO2: 97%   Weight: 215 lb (97.5 kg)   Height: 6' (1.829 m)       Patient was given the following medications:  Medications   0.9 % sodium chloride bolus (has no administration in time range)   sodium chloride 0.9 % 7,883 mL with folic acid 1 mg, adult multi-vitamin with vitamin k 10 mL, thiamine 100 mg (has no administration in time range)       Patient was seen and evaluated by Darcy Phelps and myself. Patient here tonight for suicidal ideations. Patient reportedly called a friend making suicidal comments to then called 911. Patient was brought in by the police under a psychiatric. Patient reports that he does drink at least 4 beers and today. Patient reports that he drank just prior to coming to the ED. Patient states that he has been suicidal for a while. On exam he is awake and alert. Slightly tachycardic and did not make eye contact and Dissection through the entire conversation. Patient reports he does not have seizures when he does not drink. Lab values have all been reviewed and interpreted. Patient's alcohol level was found to 250. He is acutely intoxicated and suicidal therefore consult was placed the hospitalist for admission. The patient tolerated their visit well. They were seen and evaluated by the attendingphysician, No att. providers found who agreed with the assessment and plan. The patient and / or the family were informed of the results of any tests, a time was given to answer questions, a plan was proposed and they agreed Alicia Angulo. FINAL IMPRESSION      1. Acute alcoholic intoxication without complication ([facility]    2. Suicidal ideation          DISPOSITION/PLAN   DISPOSITION Decision To Admit 05/08/2019 11:09:04 PM      PATIENT REFERRED TO:  No follow-up provider specified.     DISCHARGE MEDICATIONS:  New Prescriptions    No medications on file       DISCONTINUED MEDICATIONS:  Discontinued Medications    NALTREXONE (DEPADE) 50 MG TABLET    Take 50 mg by mouth daily    ONDANSETRON (ZOFRAN) 4 MG TABLET    Take 1 tablet by mouth every 8 hours as needed for Nausea or Vomiting              (Please note that portions of this note were completed with a voice recognition program.  Efforts were made to edit the dictations but occasionally words are mis-transcribed.)    MARÍA ELENA Rodgers CNP (electronically signed)       MARÍA ELENA Rodgers CNP  05/08/19 3926

## 2019-05-09 NOTE — PROGRESS NOTES
Arrived to 2w from ER. Pt sleepy but awakens and opens eyes to answer questions. Admission questions were completed. Pt asking how long he has to stay and why he has to stay until morning. Pt cooperative with care. Asking for water to drink & then fell back asleep. A sitter from behavior health here with the pt at all times. No skin issues noted. Pt denies c/o.

## 2019-05-09 NOTE — H&P
Hospital Medicine History & Physical      PCP: Acacia Vee MD    Date of Admission: 5/8/2019    Date of Service: Pt seen/examined on 5/8/2019 and Admitted to Inpatient with expected LOS greater than two midnights due to medical therapy. Chief Complaint:  SI      History Of Present Illness:      44 y.o. male presents with alcohol intoxication and SI. Patient denies sob, chest pain, abdominal pain, n/v, fevers chills. Past Medical History:          Diagnosis Date    Anxiety     Hyperlipidemia     Hypertension     Insomnia        Past Surgical History:          Procedure Laterality Date    KNEE ARTHROSCOPY Right 2014    WISDOM TOOTH EXTRACTION         Medications Prior to Admission:      Prior to Admission medications    Medication Sig Start Date End Date Taking? Authorizing Provider   fenofibrate 160 MG tablet  3/30/18  Yes Historical Provider, MD   irbesartan (AVAPRO) 150 MG tablet TAKE 1 TABLET BY MOUTH DAILY. 3/3/18  Yes Historical Provider, MD   montelukast (SINGULAIR) 10 MG tablet TAKE 1 TABLET BY MOUTH AT BEDTIME. 3/3/18  Yes Historical Provider, MD   zolpidem (AMBIEN) 10 MG tablet Take  by mouth nightly as needed for Sleep. Yes Historical Provider, MD   FLUoxetine (PROZAC) 20 MG capsule Take 20 mg by mouth daily. Yes Historical Provider, MD       Allergies:  Patient has no known allergies. Social History:           TOBACCO:   reports that he has never smoked. He has never used smokeless tobacco.  ETOH:   reports that he drinks alcohol. Family History:               Problem Relation Age of Onset    Heart Disease Father         MI       REVIEW OF SYSTEMS:   Pertinent positives as noted in the HPI. All other systems reviewed and negative.     PHYSICAL EXAM PERFORMED:    /74   Pulse 82   Temp 97 °F (36.1 °C) (Oral)   Resp 16   Ht 6' (1.829 m)   Wt 219 lb 11.2 oz (99.7 kg)   SpO2 97%   BMI 29.80 kg/m²     General appearance:  No apparent distress, appears stated age Status: Full Oziel Patricia MD    Thank you Tori Irizarry MD for the opportunity to be involved in this patient's care. If you have any questions or concerns please feel free to contact me at [phone].

## 2019-05-09 NOTE — PROGRESS NOTES
DC instructions printed. Pt verbalized understanding. IV removed no complications. Pt belongings gathered.  Report called to Lio Cole at [facility]

## 2019-05-09 NOTE — PROGRESS NOTES
Pt was able to rest most of the remainder of the night. Pt came up with O2 because he was snoring so loudly with apnea so left it on during the night. Pt also noted to be snoring loudly on 2w. Pt denies c/o.

## 2019-05-09 NOTE — PROGRESS NOTES
Pt awakens easily. No c/o voiced states he drinks less than 6 beers a day. States he just felt down yesterday that he feels better today. Pt denies any withdrawal s/sx. Denies pain. Sitter at bedside. No acute distress.

## 2019-05-09 NOTE — ED NOTES
This nurse sitting with pt at this time     Cynthia Gatica [facility]  05/08/2019  [address], RN  05/09/19 5240

## 2019-05-09 NOTE — PROGRESS NOTES
4 Eyes Skin Assessment     The patient is being assess for   Admission    I agree that 2 RN's have performed a thorough Head to Toe Skin Assessment on the patient. ALL assessment sites listed below have been assessed. Areas assessed by both nurses:   [x]   Head, Face, and Ears   [x]   Shoulders, Back, and Chest, Abdomen  [x]   Arms, Elbows, and Hands   [x]   Coccyx, Sacrum, and Ischium  [x]   Legs, Feet, and Heels        Kenisha/ Richelle Faulkner RN    **SHARE this note so that the co-signing nurse is able to place an eSignature**    Co-signer eSignature: Electronically signed by Radha Castle RN on 5/9/19 at 5:41 AM    Does the Patient have Skin Breakdown?   No          Trever Prevention initiated:  No   Wound Care Orders initiated:  No      [facility] nurse consulted for Pressure Injury (Stage 3,4, Unstageable, DTI, NWPT, Complex wounds)and New or Established Ostomies:  No      Primary Nurse eSignature: Electronically signed by Antonella Chandra RN on 5/9/19 at 1:37 AM

## 2019-05-09 NOTE — ED NOTES
2313- Consult placed to hospitalist Dr. Manpreet Toney for Cornell Veloz NP   [id number]- Dr. Manpreet Toney returned page and spoke to Cornell Cervantes  05/08/19 835 [facility]  05/08/19 2942

## 2019-05-09 NOTE — ED NOTES
Pt placed on 2l Nc at this time due to o2 sat running around 87-88% on [address] [facility] Austyn, RN  05/08/19 4664

## 2019-05-10 PROBLEM — F33.2 SEVERE EPISODE OF RECURRENT MAJOR DEPRESSIVE DISORDER, WITHOUT PSYCHOTIC FEATURES (HCC): Status: ACTIVE | Noted: 2019-05-10

## 2019-05-10 LAB
BASOPHILS ABSOLUTE: 0 K/UL (ref 0–0.2)
BASOPHILS RELATIVE PERCENT: 0.5 %
EOSINOPHILS ABSOLUTE: 0.1 K/UL (ref 0–0.6)
EOSINOPHILS RELATIVE PERCENT: 1.3 %
HCT VFR BLD CALC: 37.5 % (ref 40.5–52.5)
HEMOGLOBIN: 13 G/DL (ref 13.5–17.5)
LYMPHOCYTES ABSOLUTE: 2.7 K/UL (ref 1–5.1)
LYMPHOCYTES RELATIVE PERCENT: 45.6 %
MCH RBC QN AUTO: 31.6 PG (ref 26–34)
MCHC RBC AUTO-ENTMCNC: 34.7 G/DL (ref 31–36)
MCV RBC AUTO: 91.2 FL (ref 80–100)
MONOCYTES ABSOLUTE: 0.4 K/UL (ref 0–1.3)
MONOCYTES RELATIVE PERCENT: 7 %
NEUTROPHILS ABSOLUTE: 2.7 K/UL (ref 1.7–7.7)
NEUTROPHILS RELATIVE PERCENT: 45.6 %
PDW BLD-RTO: 13.4 % (ref 12.4–15.4)
PLATELET # BLD: 220 K/UL (ref 135–450)
PMV BLD AUTO: 7.1 FL (ref 5–10.5)
RBC # BLD: 4.11 M/UL (ref 4.2–5.9)
WBC # BLD: 5.9 K/UL (ref 4–11)

## 2019-05-10 PROCEDURE — 6370000000 HC RX 637 (ALT 250 FOR IP): Performed by: PSYCHIATRY & NEUROLOGY

## 2019-05-10 PROCEDURE — 99223 1ST HOSP IP/OBS HIGH 75: CPT | Performed by: PSYCHIATRY & NEUROLOGY

## 2019-05-10 PROCEDURE — 6370000000 HC RX 637 (ALT 250 FOR IP): Performed by: PHYSICIAN ASSISTANT

## 2019-05-10 PROCEDURE — 85025 COMPLETE CBC W/AUTO DIFF WBC: CPT

## 2019-05-10 PROCEDURE — 36415 COLL VENOUS BLD VENIPUNCTURE: CPT

## 2019-05-10 PROCEDURE — 99232 SBSQ HOSP IP/OBS MODERATE 35: CPT | Performed by: PHYSICIAN ASSISTANT

## 2019-05-10 PROCEDURE — 1240000000 HC EMOTIONAL WELLNESS R&B

## 2019-05-10 RX ADMIN — Medication 100 MG: at 08:33

## 2019-05-10 RX ADMIN — FENOFIBRATE 160 MG: 160 TABLET ORAL at 08:33

## 2019-05-10 RX ADMIN — LOSARTAN POTASSIUM 50 MG: 25 TABLET, FILM COATED ORAL at 08:33

## 2019-05-10 RX ADMIN — FOLIC ACID 1 MG: 1 TABLET ORAL at 08:33

## 2019-05-10 RX ADMIN — FLUOXETINE 20 MG: 20 CAPSULE ORAL at 08:33

## 2019-05-10 RX ADMIN — MONTELUKAST SODIUM 10 MG: 10 TABLET, FILM COATED ORAL at 19:55

## 2019-05-10 RX ADMIN — TRAZODONE HYDROCHLORIDE 50 MG: 50 TABLET ORAL at 21:31

## 2019-05-10 RX ADMIN — HYDROXYZINE PAMOATE 50 MG: 50 CAPSULE ORAL at 21:37

## 2019-05-10 RX ADMIN — MULTIPLE VITAMINS W/ MINERALS TAB 1 TABLET: TAB at 08:33

## 2019-05-10 RX ADMIN — IBUPROFEN 400 MG: 400 TABLET, FILM COATED ORAL at 17:50

## 2019-05-10 RX ADMIN — LORAZEPAM 1 MG: 1 TABLET ORAL at 19:55

## 2019-05-10 ASSESSMENT — SLEEP AND FATIGUE QUESTIONNAIRES
DIFFICULTY STAYING ASLEEP: YES
DIFFICULTY FALLING ASLEEP: YES
RESTFUL SLEEP: NO
DO YOU USE A SLEEP AID: YES
DIFFICULTY ARISING: YES
SLEEP PATTERN: INSOMNIA
DO YOU HAVE DIFFICULTY SLEEPING: YES
AVERAGE NUMBER OF SLEEP HOURS: 5

## 2019-05-10 ASSESSMENT — LIFESTYLE VARIABLES: HISTORY_ALCOHOL_USE: YES

## 2019-05-10 ASSESSMENT — PAIN SCALES - GENERAL: PAINLEVEL_OUTOF10: 4

## 2019-05-10 ASSESSMENT — PATIENT HEALTH QUESTIONNAIRE - PHQ9: SUM OF ALL RESPONSES TO PHQ QUESTIONS 1-9: 21

## 2019-05-10 NOTE — CONSULTS
[address]                 [address] [facility]                                  CONSULTATION    PATIENT NAME: Nuno Hooper                        :        1975  MED REC NO:   [id number]                          ROOM:       0236  ACCOUNT NO:   [de-identified]                           ADMIT DATE: 2019  PROVIDER:     Heidy Lopez. Eldon Sam MD    DATE OF CONSULTATION:  2019    CHIEF COMPLAINT:  Suicidality and intoxication. HISTORY OF PRESENT ILLNESS:  The patient is a 43-year-old white male  with history of alcohol abuse, who presented to the ED at [facility]  on 2019 with an alcohol level of 250 and was also feeling  depressed and suicidal.  When I saw him today, he was sober. He stated  that he feels that everything is out of control in his life. He does  not know if his job is secure nor his marriage. Apparently, he and his  wife living together 17 years and he describes her as being very  supportive all these years, but recently has become more frustrated with  him that he continued to drink. He apparently was drinking yesterday  and they got into a verbal altercation. She then told him \"you can't  come home. \"  He is worried that he is losing everything and wanted to  kill himself. He then called a friend, who called police and brought  him to the hospital.  He stated that when he drinks, he will do and say  things that he normally would not say. He stated that his marriage has  been very solid over the years, and also he drinks, it is clear that his  wife is becoming less tolerant. He is also worried about his job, as he is an  at  [employer] for the past two-and-a-half years. Apparently, the  other day he was in the parking lot in his car intoxicated and 2 staff  members came out and found him in his car. He is hopeful that he will  still have his job, but he is not sure at this point.   He is denying any  active suicidal ideation at this time, but states that he does want to  get help. He states that his sleep is disrupted, appetite is intact. 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 PRIOR PSYCHIATRIC HISTORY:  Inpatient, none. Outpatient, he was in the  [facility] program in [address] [place] [address] from January 2019 for a month. He was then  sober for 2 weeks, when he got out and then relapsed and has been  drinking every since. DRUGS AND ALCOHOL USE:  He states he drinks at least 6 packs plus liquor  daily. He states he was not drinking at the high school or college, but  in grad school, he started to drink to fall sleep. That gradually  increased and he has been drinking heavily for approximately 12 years. He denies any other illicit drug use. FAMILY PSYCHIATRIC HISTORY:  Alcoholism throughout the family. No  suicides. PAST MEDICAL HISTORY:  Hypertension. CURRENT MEDICATIONS:  Fenofibrate 160 mg daily, Avapro 150 mg daily,  Singulair 10 mg daily, Ambien 10 mg at night, Prozac 20 mg daily for 10  years. Past psychiatric medications, none PCP prescribes. LEGAL ISSUES:  He has DUI 8 years ago, but nothing currently. PRIOR SUICIDE ATTEMPTS:  He states he cut his wrist a couple of years  ago after an argument with his wife. SOCIAL HISTORY:  He lives with his wife for 17 years. He has 2 children  ages 6 and 15. He coaches soccer. He works as an  at  [employer]. Past year has been much more difficult in the  marriage. ALLERGIES TO MEDICATIONS:  None known. REVIEW OF SYSTEMS:  Pertinent positives are in the HPI, otherwise  negative. PHYSICAL EXAMINATION:  Per Dr. Nadeem Baron, 05/08/2019. Vital signs:   Temperature 98.1, pulse 94, respirations 15, blood pressure 172/105. LABORATORY DATA:  Reviewed. Alcohol level was 250 at admission. Urine  drug screen was negative. Elevated alk phos 31, ALT 46, AST 45.    Thyroid function, normal bilirubin. TRAUMA HISTORY:  None reported. MENTAL STATUS EXAMINATION:  The patient is a 45-year-old white male, who  was in bed. He was accompanied by a friend. He is very pleasant and  cooperative. He engaged easily. He made good eye contact. Speech is  normal rate and tone. Thoughts are coherent and logical.  He denied  being actually suicidal or homicidal at this time. Denied auditory or  visual hallucinations. Insight and judgment impaired. Oriented to  person, place and time. Fund of knowledge and language intact. Attention and concentration was good, able to recall three objects  immediately. He states mood was down. His affect was somewhat  constricted. Normal movement. Did not walk. Did not appear to be in  active withdrawal.    DIAGNOSES:  AXIS I:  1. Alcohol intoxication without complication. 2.  Alcohol abuse. 3.  Alcohol-induced mood disorder. AXIS II:  Deferred. AXIS III:  Osteoarthritis. AXIS IV:  Severe. AXIS V:  40. PLAN:  1. Continue to stabilize the patient. 2.  Observe for any alcohol withdrawal.  3.  We will transfer to [facility] for psychiatric inpatient treatment. 4.  The patient is agreeable. 5.  Continue with sitter. 6.  We will not begin any psychotropics other than his Prozac 20 mg  daily. I spent 110 minutes on this evaluation, more than 50% of time discussing  the patient's care, treatment, and options.         Erik Knapp MD    D: 05/09/2019 19:12:04       T: 05/09/2019 21:55:38     [email or web address]  Job#: [id number]     Doc#: [id number]    CC:

## 2019-05-10 NOTE — PROGRESS NOTES
1:1 Assessment 30 minutes. Patient is alert and oriented x 4. Uses direct eye contact and is dressed appropriately in street clothing. Patient denied SI/HI,A/V hallucinations. The patient stated his mood was:\"OK\". Patient did attend groups this shift and participated appropriately. The patient does understand why he is here. Patient is hoping to be discharged from the hospital when stable and has expressed a desire to go to an outpatient rehabilitation program for alcohol. Patient is medication compliant. Judgement,insight and impulse control are limited. Vital signs are noted. Safety checks continue Q 15 minutes throughout the shift. PRNS this shift? Ativan 1 mg -CIWA 10, ativan 2 mg -CIWA 11, vistaril 50 mg for anxiety not relieved with ativan and trazodone 50 mg po for sleep. .  Patient is noted to be asleep by 2314. Patient obtained 6.25 hours of sleep this shift.

## 2019-05-10 NOTE — PROGRESS NOTES
Patient has slept greater than 7 hours this shift. Vital signs obtained and patient voiced that he felt that he slept a good amount and feels a little better. Vital signs are stable. Reminded patient to arise slow from bed this morning. Patient agreed to do the same.

## 2019-05-10 NOTE — PROGRESS NOTES
Blood pressure is starting to trend down. Patient is hoping to sleep this evening. Will continue to monitor blood pressure when patient awakes.

## 2019-05-10 NOTE — FLOWSHEET NOTE
Met with Pt for Epic consult. Listened and offered emotional support. Pt became very tearful. Shared about his struggles with alcoholism and its effect on his family and his loss of his job. Expressed feeling overwhelmed and helpless. \"I feel broken and lost.: Wanting God's help to begin to heal and get things under control.  offered assurance of God's love and forgiveness. Prayed with Pt for his recovery, sense of God's presence and love, and for healing with relationship with family. Pt requested follow up visit on Saturday, May 11. Assured Pt that note would be left for weekend . [address] [facility]       05/10/19 1205   Encounter Summary   Services provided to: Patient   Referral/Consult From: Patient   Support System Spouse   Continue Visiting Yes  (5/10 initial, Epic consult, sppt and prayer, FOLLOW)   Complexity of Encounter High   Length of Encounter 45 minutes   Spiritual Assessment Completed Yes   Crisis   Type Emotional distress   Assessment Tearful; Anxious; Hopeless;Spiritual struggle; Helplessness; Despair;Guilt; Shame   Intervention Active listening;Explored feelings, thoughts, concerns;Prayer;Sustaining presence/ Ministry of presence; Facilitated forgiveness; Discussed relationship with God;Discussed illness/injury and it's impact   Outcome Expressed gratitude;Engaged in conversation;Expressed feelings/needs/concerns; Shared reminiscences; Receptive;Venting emotion

## 2019-05-10 NOTE — PROGRESS NOTES
Blood pressure elevated at the beginning of the shift. Patient is medicated with ativan 1 mg po for CIWA of 10, clonidine 0.1 mg po for elevated blood pressure. Patient is cooperative with care. Will continue to  Monitor.

## 2019-05-10 NOTE — PROGRESS NOTES
Patient remained anxious and stated he is afraid that he will not be able to sleep. He described his sleeping pattern as sleeping for three hours then up before he can fall back asleep. He was medicated with vistaril 50 mg po for complaint of anxiety. He was also medicated with trazodone 50 mg po for sleep. It was explained to the patent that he may feel tired soon and that he will need to rise up from chair, or bed slowly when getting up to go to the bathroom at night. Patient agreed to do this and he went to his room shortly after patient safety instruction. Will monitor for effectiveness and will monitor length of time asleep.

## 2019-05-10 NOTE — PROGRESS NOTES
Patient blood pressure remains elevated at 184/105. Rechecked CIWA score and noted to be at 11. Medicated with ativan 2 mg po for CIWA at 11. Will repeat vitals in 1 hour and reassesses CIWA score.

## 2019-05-10 NOTE — H&P
Spent at least 70 minutes with evaluation process and more than 50% of the time was spent obtaining history and planning treatment with the patient  Dx: Major depression        Alcohol Abuse        Alcohol Intoxication    [address]                 [address] [address]                                   CONSULTATION     PATIENT NAME: Ewelina Fu                        :        1975  MED REC NO:   [id number]                          ROOM:       0236  ACCOUNT NO:   [de-identified]                           ADMIT DATE: 2019  PROVIDER:     Doris Valentine. Leesa Choe MD     DATE OF CONSULTATION:  2019     CHIEF COMPLAINT:  Suicidality and intoxication.     HISTORY OF PRESENT ILLNESS:  The patient is a 71-year-old white male  with history of alcohol abuse, who presented to the ED at [facility]  on 2019 with an alcohol level of 250 and was also feeling  depressed and suicidal.  When I saw him today, he was sober. He stated  that he feels that everything is out of control in his life. He does  not know if his job is secure nor his marriage. Apparently, he and his  wife living together 17 years and he describes her as being very  supportive all these years, but recently has become more frustrated with  him that he continued to drink. He apparently was drinking yesterday  and they got into a verbal altercation. She then told him \"you can't  come home. \"  He is worried that he is losing everything and wanted to  kill himself. He then called a friend, who called police and brought  him to the hospital.  He stated that when he drinks, he will do and say  things that he normally would not say. He stated that his marriage has  been very solid over the years, and also he drinks, it is clear that his  wife is becoming less tolerant.     He is also worried about his job, as he is an  at  [employer] for the past two-and-a-half years.   Apparently, the  other day he was in the parking lot in his car intoxicated and 2 staff  members came out and found him in his car. He is hopeful that he will  still have his job, but he is not sure at this point. He is denying any  active suicidal ideation at this time, but states that he does want to  get help. He states that his sleep is disrupted, appetite is intact. 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 Today he had spoken with wife who is supportive and wants him to get help. She is not allowing him to return home at this time and he is planning to stay with father. He is not in favor of staying with him and is already seeking employment a she was terminated form  His position at [address] [place] [address] Place:  Inpatient, none. Outpatient, he was in the  [facility] program in [place] from January 2019 for a month. He was then  sober for 2 weeks, when he got out and then relapsed and has been  drinking every since.     DRUGS AND ALCOHOL USE:  He states he drinks at least 6 packs plus liquor  daily. He states he was not drinking at the high school or college, but  in grad school, he started to drink to fall sleep. That gradually  increased and he has been drinking heavily for approximately 12 years. He denies any other illicit drug use.     FAMILY PSYCHIATRIC HISTORY:  Alcoholism throughout the family. No  suicides.     PAST MEDICAL HISTORY:  Hypertension.     CURRENT MEDICATIONS:  Fenofibrate 160 mg daily, Avapro 150 mg daily,  Singulair 10 mg daily, Ambien 10 mg at night, Prozac 20 mg daily for 10  years. Past psychiatric medications, none PCP prescribes.     LEGAL ISSUES:  He has DUI 8 years ago, but nothing currently.     PRIOR SUICIDE ATTEMPTS:  He states he cut his wrist a couple of years  ago after an argument with his wife.     SOCIAL HISTORY:  He lives with his wife for 17 years. He has 2 children  ages 6 and 15. He coaches soccer.   He works as an  at  [employer]. Past year has been much more difficult in the  marriage.     ALLERGIES TO MEDICATIONS:  None known.     REVIEW OF SYSTEMS:  Pertinent positives are in the HPI, otherwise  negative.     PHYSICAL EXAMINATION:  Per Dr. Jaime Salazar, 05/08/2019. Vital signs:   Temperature 98.1, pulse 94, respirations 15, blood pressure 172/105.     LABORATORY DATA:  Reviewed. Alcohol level was 250 at admission. Urine  drug screen was negative. Elevated alk phos 31, ALT 46, AST 45. Thyroid function, normal bilirubin.     TRAUMA HISTORY:  None reported.     MENTAL STATUS EXAMINATION:  The patient is a 78-year-old white male, who  was in bed. He was accompanied by a friend. He is very pleasant and  cooperative. He engaged easily. He made good eye contact. Speech is  normal rate and tone. Thoughts are coherent and logical.  He denied  being actually suicidal or homicidal at this time. Denied auditory or  visual hallucinations. Insight and judgment impaired. Oriented to  person, place and time. Fund of knowledge and language intact. Attention and concentration was good, able to recall three objects  immediately. He states mood was down. His affect was somewhat  constricted. Normal movement. Did not walk. Did not appear to be in  active withdrawal.     DIAGNOSES:  AXIS I:  1. Alcohol intoxication without complication. 2.  Alcohol abuse. AXIS II:  Deferred. AXIS III:  Osteoarthritis. AXIS IV:  Severe. AXIS V:  40.     PLAN:  1. Continue to stabilize the patient. In full program  2. Observe for any alcohol withdrawal. CIWA . Thus far, nothing significant  3. Patient to meet with [facility] today and discuss substance abuse options  4. Continue with Prozac 20 mg QD  5.  ELOS 3-4 days         Wei Amor MD

## 2019-05-10 NOTE — PLAN OF CARE
Problem: Depressive Behavior With or Without Suicide Precautions:  Goal: Able to verbalize and/or display a decrease in depressive symptoms  Description  Able to verbalize and/or display a decrease in depressive symptoms  5/10/2019 1007 by Burton Munoz RN  Outcome: Ongoing  S: \"I slept really well last night and it helped a lot. Yes I talked with my wife again last night. \"  O: Visible on the unit at intervals and attending group activities. Affect is blunted. Eye contact improved. Pleasant on approach. A: No current suicidal ideation noted. Pt is invested in gaining control over his alcohol use and has spoken with Alecia Failing here on the unit. P: Continue as formulated. 1:1 interactions to administer meds, encourage group attendance and discuss pt progress since admission.

## 2019-05-10 NOTE — DISCHARGE SUMMARY
Physician Discharge Summary     Patient ID:  Alicia Durham  [id number]  12 y.o.  1975    Admit date: 5/8/2019    Discharge date and time: 5/9/2019  2:27 PM     Admitting Physician: Raymond Wilkinson MD     Discharge Physician: Summer Horner    Admission Diagnoses: ETOH abuse [F10.10]    Discharge Diagnoses: Principal Problem:    Alcohol-induced mood disorder ([facility]  Active Problems:    ETOH abuse    Alcoholic intoxication without complication ([facility]  Resolved Problems:    * No resolved hospital problems. *      Admission Condition: fair    Discharged Condition: stable    Indication for Admission:   44 y.o. male presents with alcohol intoxication and SI. Patient denies sob, chest pain, abdominal pain, n/v, fevers chills. Hospital Course:    Presented with alcohol intoxication,  depression and suicidal ideation    1) Suicidal ideation, Depression  - Psychiatry consulted  - placed on suicide precautions  Mr. Billy Preston is awake,  alert and oriented now. Not intoxicated anymore. ETOH level negative. Seen by psychiatry   Admits to severe depression,  he says he feels \" very sad \" \" lost everything \". But not suicidal now. He clearly admits that he wants psychiatric help. On prozac    Medically stable now . DC to inpatient psychiatric unit       2) Alcohol abuse   admitted with alcohol Intoxication  - sober now .  No withdrawal symptoms   - CIWA ordered     3) HTN  - continue home medications        DVT Prophylaxis: lovenox  Diet: DIET GENERAL;  Code Status: Full Code            Consults: psychiatry        Significant Diagnostic Studies:   Data:  CBC:   Recent Labs     05/08/19 2232 05/09/19  0628 05/10/19  0646   WBC 7.2 5.1 5.9   RBC 4.79 4.22 4.11*   HGB 15.2 13.5 13.0*   HCT 44.3 38.8* 37.5*   MCV 92.5 92.0 91.2   RDW 14.1 13.5 13.4    233 220     BMP:   Recent Labs     05/08/19 2232 05/09/19  0628    142   K 4.0 3.7    110   CO2 22 22   BUN 8 9   CREATININE 0.9 1.0     BNP: No results for input(s): BNP in the last 72 hours. PT/INR:   Recent Labs     05/08/19 2232   PROTIME 11.2   INR 0.98     APTT: No results for input(s): APTT in the last 72 hours. CARDIAC ENZYMES: No results for input(s): CKMB, CKMBINDEX, TROPONINI in the last 72 hours. Invalid input(s): CKTOTAL;3  FASTING LIPID PANEL:No results found for: CHOL, HDL, TRIG  LIVER PROFILE:   Recent Labs     05/08/19 2232 05/09/19  0628   AST 45* 38*   ALT 46* 41*   BILIDIR  --  <0.2   BILITOT 0.3 0.3   ALKPHOS 31* 27*         Treatments: as above    Discharge Exam:  Blood pressure (!) 173/87, pulse 97, temperature 98.4 °F (36.9 °C), temperature source Oral, resp. rate 17, height 6' (1.829 m), weight 219 lb 11.2 oz (99.7 kg), SpO2 98 %. General appearance:  No apparent distress, appears stated age and cooperative. awake, alert, oriented  HEENT:  Normal cephalic, atraumatic without obvious deformity. Pupils equal, round, and reactive to light. Extra ocular muscles intact. Conjunctivae/corneas clear. Neck: Supple, with full range of motion. No jugular venous distention. Trachea midline. Respiratory:  Normal respiratory effort. Clear to auscultation, bilaterally without Rales/Wheezes/Rhonchi. Cardiovascular:  Regular rate and rhythm with normal S1/S2 without murmurs, rubs or gallops. Abdomen: Soft, non-tender, non-distended with normal bowel sounds. Musculoskeletal:  No clubbing, cyanosis or edema bilaterally. Full range of motion without deformity. Skin: Skin color, texture, turgor normal.  No rashes or lesions. Neurologic:  Neurovascularly intact without any focal sensory/motor deficits.  Cranial nerves: II-XII intact, grossly non-focal.  Psychiatric:  Alert and oriented   Capillary Refill: Brisk,< 3 seconds   Peripheral Pulses: +2 palpable, equal bilaterally          Disposition: [facility] , inpatient psych unit    Patient Instructions:      Medication List      CONTINUE taking these medications    AMBIEN 10 MG tablet  Generic drug: zolpidem     fenofibrate 160 MG tablet     FLUoxetine 20 MG capsule  Commonly known as:  PROZAC     irbesartan 150 MG tablet  Commonly known as:  AVAPRO     montelukast 10 MG tablet  Commonly known as:  SINGULAIR          Activity: activity as tolerated  Diet: regular diet  Wound Care: none needed    Follow-up with PCP in 1 week.     Signed:  Julio C Lugo

## 2019-05-10 NOTE — PROGRESS NOTES
Progress Note    Admit Date:  5/9/2019    Subjective:  Mr. Bria Gonsalez has noticed some left eye pain and swelling. No fevers     Objective:   Vitals:    05/10/19 0851   BP: (!) 144/86   Pulse: 64   Resp: 16   Temp: 97.4 °F (36.3 °C)        No intake or output data in the 24 hours ending 05/10/19 1554    Physical Exam:  Gen: No distress. Alert. Eyes: PERRL. No sclera icterus. No conjunctival injection. Mild erythema to the left infraorbital region with small, nodule to the inferior, medial eyelid, TTP, no proptosis, EOMI   ENT: No discharge. Pharynx clear. Neck: No JVD. Trachea midline. Resp: No accessory muscle use. No crackles. No wheezes. No rhonchi. CV: Regular rate. Regular rhythm. No murmur. No rub. No edema. Capillary Refill: Brisk,< 3 seconds   Peripheral Pulses: +2 palpable, equal bilaterally   GI: Non-tender. Non-distended. Normal bowel sounds. Skin: Warm and dry. No nodule on exposed extremities. No rash on exposed extremities. M/S: No cyanosis. No joint deformity. No clubbing. Neuro: Awake.  Grossly nonfocal    Psych: per psych team     Scheduled Meds:   therapeutic multivitamin-minerals  1 tablet Oral Daily    thiamine  100 mg Oral Daily    folic acid  1 mg Oral Daily    fenofibrate  160 mg Oral Daily    losartan  50 mg Oral Daily    montelukast  10 mg Oral Nightly    FLUoxetine  20 mg Oral Daily       Continuous Infusions:      PRN Meds:  ondansetron, acetaminophen, ibuprofen, hydrOXYzine, haloperidol lactate, traZODone, magnesium hydroxide, aluminum & magnesium hydroxide-simethicone, LORazepam **OR** LORazepam **OR** LORazepam **OR** LORazepam      Data:  CBC:   Recent Labs     05/08/19 2232 05/09/19  0628 05/10/19  0646   WBC 7.2 5.1 5.9   HGB 15.2 13.5 13.0*   HCT 44.3 38.8* 37.5*   MCV 92.5 92.0 91.2    233 220     BMP:   Recent Labs     05/08/19 2232 05/09/19  0628    142   K 4.0 3.7    110   CO2 22 22   BUN 8 9   CREATININE 0.9 1.0     LIVER PROFILE:   Recent Labs     05/08/19 2232 05/09/19  0628   AST 45* 38*   ALT 46* 41*   BILIDIR  --  <0.2   BILITOT 0.3 0.3   ALKPHOS 31* 27*     PT/INR:   Recent Labs     05/08/19 2232   PROTIME 11.2   INR 0.98       CULTURES  None     RADIOLOGY  No orders to display       Assessment/Plan:  Suicidal ideation  - Per psych    Infra-orbital edema and pain   - Appears to be a stye   - warm compresses  - Will add NSAIDs for sx control  - Will montior closely for pre-septal cellulitis--no ABx needed at this time     Alcohol abuse  - Continue CIWA ordered     HTN  - continue home medications  - Hypertensive on arrival to the psych floor.  One time dose of Clonidine given   - BP mildly elevated today   - Continue to monitor       Karen Amaro 3:54 PM 5/10/2019

## 2019-05-10 NOTE — PROGRESS NOTES
--------------- APPROVED REPORT --------------





EKG Measurement

Heart [id number]

NE [id number]

[id number]

[id number]

[id number]



<Conclusion>

Sinus bradycardia

Incomplete right bundle branch block

Borderline ECG Patient is noted to be asleep at this time. Patient is noted to snore when sleeping.

## 2019-05-11 PROCEDURE — 6370000000 HC RX 637 (ALT 250 FOR IP): Performed by: PHYSICIAN ASSISTANT

## 2019-05-11 PROCEDURE — 6370000000 HC RX 637 (ALT 250 FOR IP): Performed by: PSYCHIATRY & NEUROLOGY

## 2019-05-11 PROCEDURE — 1240000000 HC EMOTIONAL WELLNESS R&B

## 2019-05-11 PROCEDURE — 99232 SBSQ HOSP IP/OBS MODERATE 35: CPT | Performed by: PHYSICIAN ASSISTANT

## 2019-05-11 RX ADMIN — HYDROXYZINE PAMOATE 50 MG: 50 CAPSULE ORAL at 21:34

## 2019-05-11 RX ADMIN — IBUPROFEN 400 MG: 400 TABLET, FILM COATED ORAL at 08:37

## 2019-05-11 RX ADMIN — MULTIPLE VITAMINS W/ MINERALS TAB 1 TABLET: TAB at 08:36

## 2019-05-11 RX ADMIN — HYDROXYZINE PAMOATE 50 MG: 50 CAPSULE ORAL at 08:37

## 2019-05-11 RX ADMIN — FENOFIBRATE 160 MG: 160 TABLET ORAL at 08:37

## 2019-05-11 RX ADMIN — FOLIC ACID 1 MG: 1 TABLET ORAL at 08:37

## 2019-05-11 RX ADMIN — Medication 100 MG: at 08:36

## 2019-05-11 RX ADMIN — TRAZODONE HYDROCHLORIDE 50 MG: 50 TABLET ORAL at 21:34

## 2019-05-11 RX ADMIN — MONTELUKAST SODIUM 10 MG: 10 TABLET, FILM COATED ORAL at 21:25

## 2019-05-11 RX ADMIN — FLUOXETINE 20 MG: 20 CAPSULE ORAL at 08:37

## 2019-05-11 RX ADMIN — LOSARTAN POTASSIUM 50 MG: 25 TABLET, FILM COATED ORAL at 08:36

## 2019-05-11 ASSESSMENT — PAIN SCALES - GENERAL: PAINLEVEL_OUTOF10: 4

## 2019-05-11 NOTE — PROGRESS NOTES
Progress Note    Admit Date:  5/9/2019    Subjective:  Mr. Angelita Ulloa feels like his eye swelling is improving     Objective:   Vitals:    05/11/19 0930   BP: 135/78   Pulse: 62   Resp: 16   Temp:         No intake or output data in the 24 hours ending 05/11/19 1328    Physical Exam:  Gen: No distress. Alert. Eyes: PERRL. No sclera icterus. No conjunctival injection. Mild erythema to the left infraorbital region with small, nodule to the inferior, medial eyelid, TTP, no proptosis, EOMI-->erythema and edema mildly improved   ENT: No discharge. Pharynx clear. Neck: No JVD. Trachea midline. Resp: No accessory muscle use. No crackles. No wheezes. No rhonchi. CV: Regular rate. Regular rhythm. No murmur. No rub. No edema. Capillary Refill: Brisk,< 3 seconds   Peripheral Pulses: +2 palpable, equal bilaterally   GI: Non-tender. Non-distended. Normal bowel sounds. Skin: Warm and dry. No nodule on exposed extremities. No rash on exposed extremities. M/S: No cyanosis. No joint deformity. No clubbing. Neuro: Awake.  Grossly nonfocal    Psych: per psych team     Scheduled Meds:   therapeutic multivitamin-minerals  1 tablet Oral Daily    thiamine  100 mg Oral Daily    folic acid  1 mg Oral Daily    fenofibrate  160 mg Oral Daily    losartan  50 mg Oral Daily    montelukast  10 mg Oral Nightly    FLUoxetine  20 mg Oral Daily       Continuous Infusions:      PRN Meds:  ondansetron, acetaminophen, ibuprofen, hydrOXYzine, haloperidol lactate, traZODone, magnesium hydroxide, aluminum & magnesium hydroxide-simethicone, LORazepam **OR** LORazepam **OR** LORazepam **OR** LORazepam      Data:  CBC:   Recent Labs     05/08/19 2232 05/09/19  0628 05/10/19  0646   WBC 7.2 5.1 5.9   HGB 15.2 13.5 13.0*   HCT 44.3 38.8* 37.5*   MCV 92.5 92.0 91.2    233 220     BMP:   Recent Labs     05/08/19 2232 05/09/19  0628    142   K 4.0 3.7    110   CO2 22 22   BUN 8 9   CREATININE 0.9 1.0     LIVER PROFILE:

## 2019-05-11 NOTE — PLAN OF CARE
Problem: Suicide risk  Goal: Provide patient with safe environment  Description  Provide patient with safe environment  Outcome: Completed     Problem: Depressive Behavior With or Without Suicide Precautions:  Goal: Able to verbalize acceptance of life and situations over which he or she has no control  Description  Able to verbalize acceptance of life and situations over which he or she has no control  Outcome: Ongoing  Note:   Brightened. Future oriented. Discusses easily his desire to seek out substance abuse treatment. Attending groups and is social in the milieu. Good eye contact. Denies alcohol withdraw symptoms. Problem: Depressive Behavior With or Without Suicide Precautions:  Goal: Ability to disclose and discuss suicidal ideas will improve  Description  Ability to disclose and discuss suicidal ideas will improve  Outcome: Met This Shift  Note:   Denies having thoughts of self harm and contracts for safety.

## 2019-05-11 NOTE — GROUP NOTE
Group Therapy Note    Date: May 10    Group Start Time: 2100  Group End Time: 2110  Group Topic: Wrap-Up    [facility]        Group Therapy Note    Attendees: goals and importance of goal setting discussed.   Night time milieu activities discussed         Patient's Goal:  To accept what was was and what will be will be    Notes:  Much better than previous day    Status After Intervention:  Improved    Participation Level: Interactive    Participation Quality: Appropriate and Sharing      Speech:  normal      Thought Process/Content: Logical      Affective Functioning: Congruent      Mood: good      Level of consciousness:  Alert and Oriented x4      Response to Learning: Progressing to goal      Endings: None Reported    Modes of Intervention: Support      Discipline Responsible: [address] [place]      Signature:  Mary Wiggins

## 2019-05-11 NOTE — FLOWSHEET NOTE
Spiritual Care follow up    Provided a follow up visit from [facility] visit yesterday. Offered supportive care and listening. Patient sharing a sense of \"brokenness\" and a desire to connect with his spirituality to help him in the midst of loss of employment and marriage. He was tearful yet feels like his conversations with Paris Mock and myself today, along with his groups are giving him positive images to reflect upon and help him take initial steps to move toward a healing process. I shared with him a scripture for Emil 30: 21 today. \"Whether you turn to the right or to the left, your ears will hear a voice behind you, saying, This is the way; walk in it.      This scripture is a helpful scripture to help guide someone in first steps and know that the presence of God is with them. The patient welcomed this scripture and connected with its sentiment. We also shared in prayer together. Thank you for the opportunity to offers support. Keisha Porter, [facility]      05/11/19 1234   Encounter Summary   Services provided to: Patient   Referral/Consult From: Patient; Other    Support System Spouse   Continue Visiting   (5/11 Support visit/listening/prayer/scripture)   Complexity of Encounter High   Length of Encounter 30 minutes   Spiritual Assessment Completed Yes   Grief and Life Adjustment   Type Grief and loss   Assessment Approachable;Tearful; Anxious; Fearful;Guilt   Intervention Active listening;Explored feelings, thoughts, concerns;Explored coping resources;Nurtured hope;Prayer;Scripture;Sustaining presence/ Ministry of presence;Empowerment; Discussed meaning/purpose;Discussed relationship with God   Outcome Connection/belonging;Comfort;Expressed gratitude;Expressed feelings/needs/concerns;Encouraged

## 2019-05-11 NOTE — PLAN OF CARE
Pt has been visible on unit. Attended 12 step group. Pleasant but little animation c/o anxiety. CIWA sore was 8, gave Ativan as per protocol. Denied SI/HI. Denied hallucinations, paranoia. Stated that he feels more hopeful and in control of self that when first here despite his circumstances not changing-more resolve. Reported that had a good day today. Med was effective.

## 2019-05-12 VITALS
DIASTOLIC BLOOD PRESSURE: 102 MMHG | HEART RATE: 54 BPM | RESPIRATION RATE: 18 BRPM | HEIGHT: 72 IN | WEIGHT: 219 LBS | TEMPERATURE: 97.6 F | BODY MASS INDEX: 29.66 KG/M2 | SYSTOLIC BLOOD PRESSURE: 168 MMHG

## 2019-05-12 PROCEDURE — 6370000000 HC RX 637 (ALT 250 FOR IP): Performed by: PSYCHIATRY & NEUROLOGY

## 2019-05-12 PROCEDURE — 99239 HOSP IP/OBS DSCHRG MGMT >30: CPT | Performed by: NURSE PRACTITIONER

## 2019-05-12 PROCEDURE — 6370000000 HC RX 637 (ALT 250 FOR IP): Performed by: PHYSICIAN ASSISTANT

## 2019-05-12 RX ORDER — TRAZODONE HYDROCHLORIDE 50 MG/1
50 TABLET ORAL NIGHTLY PRN
Qty: 30 TABLET | Refills: 0 | Status: SHIPPED | OUTPATIENT
Start: 2019-05-12

## 2019-05-12 RX ORDER — FLUOXETINE HYDROCHLORIDE 20 MG/1
20 CAPSULE ORAL DAILY
Qty: 30 CAPSULE | Refills: 0 | Status: SHIPPED | OUTPATIENT
Start: 2019-05-12

## 2019-05-12 RX ADMIN — FOLIC ACID 1 MG: 1 TABLET ORAL at 09:21

## 2019-05-12 RX ADMIN — FLUOXETINE 20 MG: 20 CAPSULE ORAL at 09:21

## 2019-05-12 RX ADMIN — Medication 100 MG: at 09:21

## 2019-05-12 RX ADMIN — LOSARTAN POTASSIUM 50 MG: 25 TABLET, FILM COATED ORAL at 09:21

## 2019-05-12 RX ADMIN — FENOFIBRATE 160 MG: 160 TABLET ORAL at 09:20

## 2019-05-12 RX ADMIN — MULTIPLE VITAMINS W/ MINERALS TAB 1 TABLET: TAB at 09:21

## 2019-05-12 NOTE — GROUP NOTE
Group Therapy Note    Date: May 12    Group Start Time: 1000  Group End Time: 1200  Group Topic: Psychoeducation    [facility]        Group Therapy Note    Attendees: 11         Patient's Goal: Patients were first asked to share a strength, talent and/or interest for strength and rapport building. Patients then received and were invited to discuss a handout on radical acceptance, encouraging conversation about what it was and how to practice it. Patients were then asked to engage in art making on the topic of radical acceptance and to share their work with the group. 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 \"    Status After Intervention:  Improved    Participation Level:  Active Listener and Interactive    Participation Quality: Appropriate, Attentive, Sharing and Supportive      Speech:  normal      Thought Process/Content: Logical      Affective Functioning: Blunted      Mood: depressed      Level of consciousness:  Alert, Oriented x4 and Attentive      Response to Learning: Able to verbalize current knowledge/experience, Able to verbalize/acknowledge new learning, Able to retain information and Capable of insight      Endings: None Reported    Modes of Intervention: Education, Support, Socialization and Exploration      Discipline Responsible: Psychoeducational Specialist      Signature:  Sarai Mcghee

## 2019-05-12 NOTE — PLAN OF CARE
Problem: Depressive Behavior With or Without Suicide Precautions:  Goal: Able to verbalize and/or display a decrease in depressive symptoms  Description  Able to verbalize and/or display a decrease in depressive symptoms  5/12/2019 1250 by Rubina Sanchez RN  Outcome: Met This Shift  Patient stated that he hopes to go home today. He stated \" I am ready to move on\" patient states that he would like to continue with AA. Problem: Depressive Behavior With or Without Suicide Precautions:  Goal: Able to verbalize support systems  Description  Able to verbalize support systems  Patient stated that he had a strong support system at home with his wife and friends.

## 2019-05-12 NOTE — PLAN OF CARE
Problem: Depressive Behavior With or Without Suicide Precautions:  Goal: Able to verbalize and/or display a decrease in depressive symptoms  Description  Able to verbalize and/or display a decrease in depressive symptoms  Outcome: Ongoing  Renay Villasenor was visible and social in the milieu this evening. He attended all of the evening groups and participated. Problem: Depressive Behavior With or Without Suicide Precautions:  Goal: Ability to disclose and discuss suicidal ideas will improve  Description  Ability to disclose and discuss suicidal ideas will improve  Outcome: Ongoing   Renay Villasenor denied suicidal and homicidal ideations this evening. He denied hallucinations. Problem: Depressive Behavior With or Without Suicide Precautions:  Goal: Able to verbalize support systems  Description  Able to verbalize support systems  Outcome: Ongoing   Renay Villasenor stated that he is thinking about changing jobs and going to school for a new 2 year degree. He is interested in . Renay Villasenor was able to state that [employer].

## 2019-05-12 NOTE — BH NOTE
0950 B/P 135/78 HR 62 Reports anxiety has improved and headache is gone. PRN medication effective. PA in to see to evaluate lt eye lower lid redness and stated it is improving.
2758 received Vistaril 50 mg PO for management of mild anxiety and Ibuprofen 400 mg PO for management of headache. Out to dinning area for meal. Reported food was good. No nausea, tremors, sweating, visual or audio complaints. Reports hx of hypertension. B/P 168/90. Received scheduled medications at this time.
[address] [place] [address]  Day 3 Interdisciplinary Treatment Plan NOTE    Review Date & Time: 05/12/2019 0800    Patient was not in treatment team    Admission Type:   Admission Type:  Involuntary    Reason for admission:  Reason for Admission: SI with plan  Estimated Length of Stay Update:  1-3 days   Estimated Discharge Date Update: 1-3 days     PATIENT STRENGTHS:  Patient Strengths Strengths: Employment, No significant Physical Illness, Social Skills, Communication, Positive Support  Patient Strengths and Limitations:Limitations: Difficult relationships / poor social skills, Tendency to isolate self, Lacks leisure interests, Inappropriate/potentially harmful leisure interests, Apathetic / unmotivated  Addictive Behavior:Addictive Behavior  In the past 3 months, have you felt or has someone told you that you have a problem with:  : None  Do you have a history of Chemical Use?: No  Do you have a history of Alcohol Use?: Yes  Do you have a history of Street Drug Abuse?: No  Histroy of Prescripton Drug Abuse?: No  Medical Problems:  Past Medical History:   Diagnosis Date    Anxiety     Hyperlipidemia     Hypertension     Insomnia        Risk:  Fall RiskTotal: 61  Trever Scale Trever Scale Score: 22  BVC Total: 0  Change in scores No. Changes to plan of Care  No    Status EXAM:   Status and Exam  Normal: Yes  Facial Expression: Brightened  Affect: Appropriate  Level of Consciousness: Alert  Mood:Normal: Yes  Mood: Depressed(reports decreased depression and is future oriented)  Motor Activity:Normal: Yes  Motor Activity: Decreased  Interview Behavior: Cooperative  Preception: [place] to Person, [place] to Time, [place] to Place, [place] to Situation  Attention:Normal: Yes  Attention: Distractible, Unable to Concentrate  Thought Processes: Circumstantial  Thought Content:Normal: Yes  Hallucinations: None  Delusions: No  Memory:Normal: Yes  Insight and Judgment: No  Insight and Judgment: Poor Judgment  Present Suicidal
Bridge Appointment completed: Reviewed Discharge Instructions with patient. Patient verbalizes understanding and agreement with the discharge plan using the teachback method. Referral for Outpatient Tobacco Cessation Counseling, upon discharge (nona X if applicable and completed):    ( )  Hospital staff assisted patient to call Quit Line or faxed referral                                   during hospitalization                  ( )  Recognizing danger situations (included triggers and roadblocks), if not completed on admission                    ( )  Coping skills (new ways to manage stress, exercise, relaxation techniques, changing routine, distraction), if not completed on admission                                                           ( )  Basic information about quitting (benefits of quitting, techniques in how to quit, available resources, if not completed on admission  ( ) Referral for counseling faxed to [facility]   ( ) Patient refused referral  ( x) Patient refused counseling  ( ) Patient refused smoking cessation medication upon discharge  Patient does not smoke   Vaccinations (nona X if applicable and completed):  ( ) Patient states already received influenza vaccine elsewhere  ( ) Patient received influenza vaccine during this hospitalization  (x ) Patient refused influenza vaccine at this time    [facility]  Discharge Note    Pt discharged with followings belongings:   Dentures: None  Vision - Corrective Lenses: None  Hearing Aid: None  Jewelry: None  Clothing: Pants, Shirt, Socks, Undergarments (Comment)  Were All Patient Medications Collected?: Not Applicable  Other Valuables: None   Valuables sent home with patient. Valuables retrieved from safe,  and returned to patient. Patient left department with Departure Mode: By self via Mobility at Departure: Ambulatory, discharged to Discharged to: Private Residence.  Patient education on aftercare instructions: yes
Comments: + interactions, + contribution, and + engagement.         Time: 1600       Type of Group: wellness      Level of Participation: active
Group Therapy Note      Date: 5/10/2019  Start Time: 1600  End Time:  1700  Number of Participants: 6    A JANICE volunteer came in and led group.        Signature:  Dottie Do RN
Met with patient to complete psychosocial assessment. Patient reports that he lost his job as an  of [employer]. Also reports that his wife is leaving him due to his drinking. Gives a history of drinking problem x10 years. Reports that he has been to treatment at [facility] for alcohol abuse earlier this year. Self describes as an alcoholic. Is potentially interested in treatment but is still in the persuasion phase. No a/v hallucinations. No current s/h ideation. Discussed case with discharge planner to potentially address AoD.
Patient hypertensive.   Given clonidine 0.1 mg per order, see MAR
Pt has BP of 190/109 pulse 94--Jacob LAGUNA informed
Time:2100    Type of Group:wrap up      Level of Participation:100%    Comments:pt met goal
Walked the unit \"just getting my steps in\". After resting for 20 min. B/P 157/90 HR 66. Denies withdraw symptoms. Discuss follow care after discharge. Verbalized desire to work with an organization for alcohol addiction. Reports, now, having options. Comfort Prows to meet with the provider.
to [place]                                           ( ) Patient refused counseling  ( ) Patient has not smoked in the last 30 days    Metabolic Screening:    No results found for: LABA1C    No results found for: CHOL  No results found for: TRIG  No results found for: HDL  No components found for: LDLCAL  No results found for: LABVLDL      Body mass index is 29.7 kg/m². BP Readings from Last 2 Encounters:   05/09/19 (!) 172/105   05/09/19 (!) 173/87           Pt admitted with followings belongings:  Dentures: None  Vision - Corrective Lenses: None  Hearing Aid: None  Jewelry: None  Clothing: Pants, Shirt, Socks, Undergarments (Comment)  Were All Patient Medications Collected?: Not Applicable  Other Valuables: None     Valuables sent home with N/A. Valuables placed in safe in security envelope, number:  N/A. Patient's home medications were N/A. Patient oriented to surroundings and program expectations and copy of patient rights given. Received admission packet:  YES. Consents reviewed, signed YES. Refused NONE. Patient verbalize understanding:  YES.     Patient education on precautions: Bernard Elmore RN

## 2019-05-12 NOTE — GROUP NOTE
Group Therapy Note    Date: May 12    Group Start Time: 1315  Group End Time: 1400  Group Topic: Cognitive Skills    [facility]        Group Therapy Note    Attendees: 9         Group topic was Distress Tolerance skills     Notes:  Pt was fully engaged in group discussion and participated fully in group. Status After Intervention:  Improved    Participation Level: Active Listener and Interactive    Participation Quality: Appropriate and Attentive      Speech:  normal      Thought Process/Content: Logical      Affective Functioning: Constricted/Restricted      Mood: depressed      Level of consciousness:  Alert      Response to Learning: Able to verbalize current knowledge/experience, Able to verbalize/acknowledge new learning and Able to retain information      Endings: None Reported    Modes of Intervention: Education, Support, Socialization and Clarifying      Discipline Responsible: /Counselor      Signature:   RADHA Kong

## 2019-05-13 NOTE — PROGRESS NOTES
Day of Discharge Provider Note      DISCHARGE DIAGNOSIS:   LYNNE/Katherine Pulido 1106 Problems    Diagnosis Date Noted    Severe episode of recurrent major depressive disorder, without psychotic features ([facility] [F33.2] 05/10/2019    Hordeolum externum of left lower eyelid [H00.015]     Hypertension [P38]     Alcoholic intoxication without complication ([facility] [place] [Z01.398] 05/09/2019    ETOH abuse [F10.10] 05/08/2019            Medication List      START taking these medications    traZODone 50 MG tablet  Commonly known as:  DESYREL  Take 1 tablet by mouth nightly as needed for Sleep        CONTINUE taking these medications    fenofibrate 160 MG tablet     FLUoxetine 20 MG capsule  Commonly known as:  PROZAC  Take 1 capsule by mouth daily     irbesartan 150 MG tablet  Commonly known as:  AVAPRO     montelukast 10 MG tablet  Commonly known as:  SINGULAIR        STOP taking these medications    AMBIEN 10 MG tablet  Generic drug:  zolpidem           Where to Get Your Medications      These medications were sent to [facility]/pharmacy [place] [address], [address] [place] [address]    Phone:  [phone]   · FLUoxetine 20 MG capsule  · traZODone 50 MG tablet         Patient appears to be in stable condition and close to their baseline functioning. The patient denies suicidal or homicidal ideations and is showing future orientation. Patient no longer presented an imminent risk of danger to themselves and/or others. At the time of discharge it appears that the patient has received the maximum medical benefit from this hospitalization and can be appropriately managed with community treatment. Rodrigo Oppenheim will be discharged home. They will follow-up with [facility] IOP.         Dr. Nomi Faulkner, the patient's Attending will complete the Discharge Summary    28 Minutes    Dr. Bk Cedeno, DNP, APRN, PMHNP  05/13/19

## 2019-05-14 ENCOUNTER — HOSPITAL ENCOUNTER (OUTPATIENT)
Dept: PSYCHIATRY | Age: 44
Setting detail: THERAPIES SERIES
Discharge: HOME | End: 2019-05-14
Payer: COMMERCIAL

## 2019-05-14 DIAGNOSIS — F33.2 SEVERE EPISODE OF RECURRENT MAJOR DEPRESSIVE DISORDER, WITHOUT PSYCHOTIC FEATURES (HCC): ICD-10-CM

## 2019-05-14 PROCEDURE — 90853 GROUP PSYCHOTHERAPY: CPT | Performed by: COUNSELOR

## 2019-05-14 PROCEDURE — H2020 THER BEHAV SVC, PER DIEM: HCPCS

## 2019-05-14 PROCEDURE — 90853 GROUP PSYCHOTHERAPY: CPT

## 2019-05-14 NOTE — GROUP NOTE
Group Therapy Note    Date: May 14    Group Start Time: 11:15 AM  Group End Time: 12:15 PM  Group Topic: Relapse Prevention    [facility]    Chelsey Albrecht        Group Therapy Note    Attendees: 6         Patient's Goal:  Patient will complete worksheet on Coping Skills. Will discuss how coping skills improve overall mental health. Notes:  Patient engaged well in group. Completed the worksheet and discussed. He talked about his friendships and how he uses them for support. Would like to start going to recovery meetings as well. Status After Intervention:  Improved    Participation Level:  Active Listener and Interactive    Participation Quality: Appropriate, Attentive, Sharing and Supportive      Speech:  normal      Thought Process/Content: Logical      Affective Functioning: Congruent      Mood: anxious and depressed      Level of consciousness:  Oriented x4      Response to Learning: Able to verbalize current knowledge/experience      Endings: None Reported    Modes of Intervention: Education, Support, Socialization and Exploration      Discipline Responsible: /Counselor      Signature:  Chelsey Albrecht

## 2019-05-14 NOTE — GROUP NOTE
Group Therapy Note    Date: May 14    Group Start Time: 10:00 AM  Group End Time: 11:00 AM  Group Topic: Cognitive Skills    [facility]    DIONI Odom        Group Therapy Note    Attendees: 6    Patient's Goal: To achieve a relaxed state and decrease anxiety. Notes: Pt engaged in group activity. Pt was able to achieve a relaxed state through progressive muscle relaxation exercise and aroma therapy. Pt was provided resources to obtain materials used in group session. Status After Intervention:  Improved    Participation Level:  Active Listener and Interactive    Participation Quality: Appropriate, Attentive and Sharing      Speech:  normal      Thought Process/Content: Logical      Affective Functioning: Congruent      Mood: depressed      Level of consciousness:  Alert and Oriented x4      Response to Learning: Able to retain information and Capable of insight      Endings: None Reported    Modes of Intervention: Education, Support, Socialization and Activity      Discipline Responsible: Psychoeducational Specialist      Signature:  Kaylee Thompson MA, CTRS

## 2019-05-14 NOTE — PROGRESS NOTES
Patient was referred to the outpatient program after being discharged Sunday from the inpatient unit. Patient stated that he was admitted to the inpatient unit because \" I had threatened to kill myself and a friend called 911 and they brought me in\". Patient stated that after being discharged that he did not go home. He states that he has been staying in a hotel and he will be moving in with his father. Patient has a history of ETOH use and states that he has not had a drink in a week. He states that he is wanting to attend AA. He states that the hotel he is staying at does not have  A bar. Patient texting on his phone during assessment. He states that his wife is going to meet with an  today to begin the dissolution, but that she is supportive and that they are \" In a good place\". He reports that this is not unexpected. He denies SI/HI,AVH. Patient stated that he is actively searching for a job and would like to attend 2 days of IOP. Tuesday and Thursday.

## 2019-05-21 ENCOUNTER — HOSPITAL ENCOUNTER (OUTPATIENT)
Dept: PSYCHIATRY | Age: 44
Setting detail: THERAPIES SERIES
Discharge: HOME | End: 2019-05-21
Payer: COMMERCIAL

## 2019-05-21 DIAGNOSIS — F33.2 SEVERE EPISODE OF RECURRENT MAJOR DEPRESSIVE DISORDER, WITHOUT PSYCHOTIC FEATURES (HCC): ICD-10-CM

## 2019-05-21 PROCEDURE — 90853 GROUP PSYCHOTHERAPY: CPT

## 2019-05-21 PROCEDURE — 90853 GROUP PSYCHOTHERAPY: CPT | Performed by: COUNSELOR

## 2019-05-21 NOTE — BH NOTE
Patient called and stated that he is going to be in today but will be late. He anticipates that he will be here in time for the 1000 group.

## 2019-05-21 NOTE — GROUP NOTE
Group Therapy Note    Date: May 21    Group Start Time: 11:15 AM  Group End Time: 12:15 PM  Group Topic: Relapse Prevention    [facility]    Sully Hopper, [facility]        Group Therapy Note    Attendees: 6         Patient's Goal:  Patient will complete worksheet on People Pleasing. Will continue to discuss how boundaries and improved mood are related. Notes:  Patient engaged well in group. Completed the worksheet and discussed. He talked about his struggle with being assertive in the workplace. He talked about his inability to say no to his employer when he is feeling overwhelmed and how this leads to resentment. Status After Intervention:  Improved    Participation Level:  Active Listener and Interactive    Participation Quality: Appropriate, Attentive, Sharing and Supportive      Speech:  normal      Thought Process/Content: Logical      Affective Functioning: Congruent      Mood: anxious and depressed      Level of consciousness:  Oriented x4      Response to Learning: Able to verbalize current knowledge/experience      Endings: None Reported    Modes of Intervention: Education, Support, Socialization and Exploration      Discipline Responsible: /Counselor      Signature:  Sully Hopper, [facility]

## 2019-05-21 NOTE — GROUP NOTE
Group Therapy Note    Date: May 21    Group Start Time: 10:00 AM  Group End Time: 11:00 AM  Group Topic: Psychoeducation (Music Therapy)    [facility]    Jaxon Gar        Group Therapy Note    Attendees: 6    Patient's Goal: to express self-image, identify future goals and aspirations, and identify barriers to progress to improve self-confidence, promote self-efficacy, and express identity. Notes: Romy Islas actively listened to music utilized and discussed lyrics with peers. Romy Islas stated his strength is \"seeing the good in people. \" Pt stated he wants to [facility] on my sobriety and staying mindful. \" Romy Islas responded well to feedback from peers. Status After Intervention:  Improved    Participation Level:  Active Listener    Participation Quality: Appropriate and Attentive      Speech:  normal      Thought Process/Content: Linear      Affective Functioning: Congruent      Mood: anxious and depressed      Level of consciousness:  Alert, Oriented x4 and Attentive      Response to Learning: Able to verbalize current knowledge/experience, Able to verbalize/acknowledge new learning and Capable of insight      Endings: None Reported    Modes of Intervention: Education, Support, Exploration, Clarifying, Problem-solving, Activity and Media      Discipline Responsible: Psychoeducational Specialist      Signature:  JACLYN Fulton

## 2019-05-29 ENCOUNTER — CLINICAL DOCUMENTATION (OUTPATIENT)
Dept: SPIRITUAL SERVICES | Age: 44
End: 2019-05-29

## 2019-05-29 NOTE — FLOWSHEET NOTE
Ambulatory Spiritual Care Note     PCP Name Ross Galvan MD   Scheduled Appointment Future Appointments   Date Time Provider [place]   5/30/2019  8:00 AM [facility] ROOM 1 [facility] None   6/4/2019  8:00 AM [facility] ROOM 1 [facility] None   6/6/2019  8:00 AM [facility] ROOM 1 [facility] None   6/11/2019  8:00 AM [facility] ROOM 1 [facility] None   6/13/2019  8:00 AM [facility] ROOM 1 [facility] None   6/18/2019  8:00 AM [facility] ROOM 1 [facility] None   6/20/2019  8:00 AM [facility] ROOM 1 [facility] None   6/20/2019 12:15 PM Taylor Mackay MD [facility] None       Visit Type: Spiritual Assessment   Place of Buddhist NA     Reason for Consultation: Life Adjustment Referral from Outpatient [facility] treatment team      Subjective:       visit with: Patient     WELL-BEING SCALE    Peace Wellbeing Scale: \"How peaceful are you feeling today? \"  (0=least peaceful, 10= most peaceful) 7     Support Wellbeing Scale: \"How supported are you feeling today? \" (0=least support, 10= most support) 8     Overall Wellbeing Scale: \"How are you feeling today? \" (0=miserable, 10= great) 7     Support System: Family members     Objective:     Approachable, Calm and Pt stated that he was coping well and hopeful for continued recovery. Stated he had had some relapses with alcohol since discharge from hospital, but had poured all alcohol in house down the drain. Assessment:   Life Adjustment  Plan:     Pt open to continued follow up for Outpatient Spiritual Care support. Will attempt to follow up with Pt in about two weeks per Outpatient Spiritual Care plan. Prayed with Pt for continued sense of hope and peace.   Follow-Up: 2 weeks per plan of care  Length of Encounter: 30 minutes  Complexity of Encounter: One Jesús Cuevas Associate       05/29/19 4264   Encounter Summary   Services provided to: Patient   Referral/Consult From: Multi-disciplinary team  (Outpatient I)   Support System Family members   Continue Visiting   (5/29 initial [facility], sppt and prayer)   Complexity of Encounter Moderate   Length of Encounter 30 minutes   Spiritual/Confucianist   Type Spiritual support   Assessment Approachable;Calm; Hopeful;Coping;Peaceful   Intervention Active listening;Explored feelings, thoughts, concerns;Prayer;Discussed illness/injury and it's impact; Discussed relationship with God   Outcome Comfort;Expressed gratitude;Engaged in conversation;Receptive

## 2019-05-30 ENCOUNTER — HOSPITAL ENCOUNTER (OUTPATIENT)
Dept: PSYCHIATRY | Age: 44
Setting detail: THERAPIES SERIES
Discharge: HOME | End: 2019-05-30
Payer: COMMERCIAL

## 2019-05-30 DIAGNOSIS — F33.2 SEVERE EPISODE OF RECURRENT MAJOR DEPRESSIVE DISORDER, WITHOUT PSYCHOTIC FEATURES (HCC): ICD-10-CM

## 2019-05-30 PROCEDURE — 90853 GROUP PSYCHOTHERAPY: CPT | Performed by: COUNSELOR

## 2019-05-30 PROCEDURE — 90853 GROUP PSYCHOTHERAPY: CPT

## 2019-05-30 NOTE — BH NOTE
Patients transportation to and from group scheduled for tomorrow. Patient made aware and appreciative, but became tearful.

## 2019-05-30 NOTE — BH NOTE
[facility] representative stated that she and the patient had a positive conversation about treatment and that he wished to speak with this writer. Patient stated that he wished to continue coming to group here and that he would like to check into [facility] for treatment. Patient stated that he would attend tomorrow to make his missed day up. Patient was very tearful and stated that he has recently lost his job and his car. He stated \" this is a major barrier for me\" . He states that he has been staying with his father in [facility].

## 2019-05-30 NOTE — GROUP NOTE
Group Therapy Note    Date: May 30    Group Start Time:  8:35 AM  Group End Time:  9:45 AM  Group Topic: Psychotherapy    [facility], [facility]    Group Therapy Note    Attendees: 7    Patient shared and set a goal at the beginning of group to practice a new way of being in group today. Notes:  Pt was engaged and set goal to [place], be positive, and take away\" what he can from peers in group. Pt seemed distressed in group with facial expression and was avoiding eye contact. Therapist prompted pt to share as he seemed to be impacted by peers in group and pt shared feeling \"lost\" stated he has lost his job, his family, and feels broken. Pt was tearful as he shared; peers were supportive and encouraging pt during group. Pt stated he needed to leave and take a break from group after sharing and receiving feedback from group members. Status After Intervention:  Improved    Participation Level:  Active Listener and Interactive    Participation Quality: Appropriate, Attentive and Sharing      Speech:  normal      Thought Process/Content: Perseverating      Affective Functioning: Congruent      Mood: anxious and depressed      Level of consciousness:  Alert and Oriented x4      Response to Learning: Able to verbalize current knowledge/experience, Able to verbalize/acknowledge new learning and Able to retain information      Endings: None Reported    Modes of Intervention: Education, Support, Socialization, Exploration and Clarifying      Discipline Responsible: /Counselor      Signature:  NAMAN Duque-S, NUPUR-JACQUES

## 2019-05-30 NOTE — BH NOTE
Group Therapy Note    Date: 5/30/2019  Start Time: 120pm  End Time:  220pm  Number of Participants: 3    Type of Group: Art Therapy    Patient's Goal:  Pts were encouraged to engage in completing a relaxing art therapy directive. Pts were offered to choose between creating a zentangle, freeform mandala including an intention and/or create an independent piece of artwork. Notes:  Romy Islas displayed positive attempts at focusing on completing a zentangle. He chose to focus on coloring sections rather than creating repeating patterns. He stated he did experience some relief when actively coloring but seemed to struggle to remain focused for long periods of time. Status After Intervention:  Improved    Participation Level:  Active Listener and Interactive    Participation Quality: Appropriate, Attentive and Sharing      Speech:  hesitant      Thought Process/Content: Logical  Perseverating      Affective Functioning: Flat      Mood: anxious and depressed      Level of consciousness:  Alert, Oriented x4 and Attentive      Response to Learning: Able to verbalize current knowledge/experience, Able to verbalize/acknowledge new learning, Able to retain information and Progressing to goal      Endings: None Reported    Modes of Intervention: Education, Exploration, Clarifying, Problem-solving, Activity and Limit-setting      Discipline Responsible: Psychoeducational Specialist      Signature:  Vonda Simpson MS, ATR-BC

## 2019-05-30 NOTE — BH NOTE
Met with patient to complete a motivational interview regarding his drinking. Patient was tearful and states that he is 10/10 on the readiness scale. Is not interested in inpatient rehab but is open to outpatient AoD treatment. No a/v hallucinations. No s/h ideations reported at present time. Is future oriented. Contracts for safety should he begin to have SI. Has phone numbers for emergency contact. Is aware of ED services should he need to come in outside of program hours.

## 2019-05-30 NOTE — GROUP NOTE
Group Therapy Note    Date: May 30    Group Start Time: 11:15 AM  Group End Time: 12:15 PM  Group Topic: Relapse Prevention    [facility]    LACHO Leyva [facility]        Group Therapy Note    Attendees: 6         Patient's Goal:  Patient will complete worksheet on Acceptance. Will discuss how acceptance improves overall mood and mental health. Notes:  Patient engaged well. He arrived a few minutes late. Completed worksheet and discussed. He talked about accepting that he may need treatment for drinking if he is unable to quit long term on his own. Received support from peers. Status After Intervention:  Improved    Participation Level:  Active Listener and Interactive    Participation Quality: Appropriate, Attentive, Sharing and Supportive      Speech:  normal      Thought Process/Content: Logical  Linear      Affective Functioning: Congruent      Mood: anxious and depressed      Level of consciousness:  Oriented x4      Response to Learning: Able to verbalize current knowledge/experience      Endings: None Reported    Modes of Intervention: Education, Support, Socialization and Exploration      Discipline Responsible: /Counselor      Signature:  LACHO Leyva [facility]

## 2019-05-31 ENCOUNTER — HOSPITAL ENCOUNTER (OUTPATIENT)
Dept: PSYCHIATRY | Age: 44
Setting detail: THERAPIES SERIES
Discharge: HOME | End: 2019-05-31
Payer: COMMERCIAL

## 2019-05-31 DIAGNOSIS — F33.2 SEVERE EPISODE OF RECURRENT MAJOR DEPRESSIVE DISORDER, WITHOUT PSYCHOTIC FEATURES (HCC): ICD-10-CM

## 2019-05-31 PROCEDURE — 90853 GROUP PSYCHOTHERAPY: CPT | Performed by: COUNSELOR

## 2019-05-31 PROCEDURE — 90853 GROUP PSYCHOTHERAPY: CPT

## 2019-05-31 NOTE — BH NOTE
Discharge Planning is not Complete. Discharge Date: 06/20/2019  Discharge Diagnosis:MAjor Depression, severe Recurrent nonpsychotic    Your instructions: Your provider here was Dr. Nohelia Youssef. If you have any questions, please do not hesitate to call the outpatient office at [phone]. Please note that we have a patient family advisory [place] that meets the second Wednesday of January and the second Wednesday of July AT 5pm in [place] [facility]. Department leadership would love for you to attend to give feedback on what we are doing well and areas in which we can improve our patient care. Please come if you are able and feel free to reach out to Santi Vega of Nursing Services at [phone] with any questions. The crisis number for [facility] is [phone] (SAVE). This crisis line is available 24 hours a day, seven days a week. You did complete a safety plan and a copy has been provided to you. Your next appointment is:  Name of Provider:    Date and time of appointment:   The type/s of services requested are: medication management, substance abuse counseling . Agency name: [facility]   Address: [address] [facility] [place], [facility]  Phone Number: [phone]  Special instructions (what to bring to appointment, etc.):       We also want to express the availability of Bridge Appointments here at [facility]. If you are connecting to community services and you find you have a long wait time until you can see your new psychiatrist, therapist, or primary care physician, know that we can provide health care services to you in the time between your care here and the beginning of your care with your new providers. You must have psychiatry and therapy appointments scheduled to be seen for medicine management. If you need a Bridge Appointment, please call the office at [phone]. Created by: Javan Silva RN-BC, BSN.     Fax to: Provider name and Number

## 2019-05-31 NOTE — GROUP NOTE
Group Therapy Note    Date: May 31    Group Start Time:  8:30 AM  Group End Time:  9:45 AM  Group Topic: Psychotherapy    [facility]    Trinh Haider, [facility]        Group Therapy Note    Attendees: 5         Patient's Goal:  Pt's goal was was to listen. Notes:  Pt was engaged in at times. Pt shared he has lost everything and does not know if he will get it back.   Pt met her goal.     Status After Intervention:  Unchanged    Participation Level: Minimal    Participation Quality: Appropriate      Speech:  hesitant      Thought Process/Content: Perseverating      Affective Functioning: Congruent      Mood: depressed      Level of consciousness:  Preoccupied and Inattentive      Response to Learning: Able to verbalize current knowledge/experience      Endings: None Reported    Modes of Intervention: Education, Support, Socialization, Exploration, Clarifying, Problem-solving, Activity and Movement      Discipline Responsible: /Counselor      Signature:  Trinh Haider, [facility]

## 2019-05-31 NOTE — GROUP NOTE
Group Therapy Note    Date: May 31    Group Start Time: 10:00 AM  Group End Time: 11:00 AM  Group Topic: Cognitive Skills    [address] [facility], [address]        Group Therapy Note    Attendees: 5    Patient's Goal: To achieve a relaxed state through aroma therapy, discuss healthy coping skills and sleeping habits. Notes: Pt engaged in group discussion and activity. Pt was able to achieve a relaxed state. Pt was given resources on materials used during group session.      Status After Intervention:  Improved    Participation Level: Minimal    Participation Quality: Appropriate      Speech:  normal      Thought Process/Content: Logical      Affective Functioning: Flat      Mood: depressed      Level of consciousness:  Alert and Oriented x4      Response to Learning: Able to retain information and Capable of insight      Endings: None Reported    Modes of Intervention: Education, Support, Socialization and Activity      Discipline Responsible: Psychoeducational Specialist      Signature:  Nadeem Sal MA, CTRS

## 2019-05-31 NOTE — BH NOTE
Group Therapy Note    Date: 5/31/2019  Start Time: 120pm  End Time:  220pm  Number of Participants: 2     Type of Group: Art Therapy     Patient's Goal:  Pts were encouraged to participate in an open studio art therapy group. Pts were offered a variety of art materials and were encouraged to make independent decisions, problem solve and share their creative expressive ideas with fellow group members.      Notes:  Romy Islas chose to create an independent pencil drawing for the duration of group. He was able to engage in a discussion with a fellow group member regarding his challenges with deciding to attend inpatient recovery treatment. Status After Intervention:  Improved    Participation Level:  Active Listener and Interactive    Participation Quality: Appropriate, Attentive, Sharing and Supportive      Speech:  normal      Thought Process/Content: Logical  Linear      Affective Functioning: Congruent      Mood: anxious and depressed      Level of consciousness:  Alert, Oriented x4 and Attentive      Response to Learning: Able to verbalize current knowledge/experience, Able to verbalize/acknowledge new learning, Able to retain information and Progressing to goal      Endings: None Reported    Modes of Intervention: Education, Support, Exploration, Clarifying, Problem-solving, Activity and Limit-setting      Discipline Responsible: Psychoeducational Specialist      Signature:  Vonda Simpson MS, ATR-BC

## 2019-06-04 ENCOUNTER — HOSPITAL ENCOUNTER (OUTPATIENT)
Dept: PSYCHIATRY | Age: 44
Setting detail: THERAPIES SERIES
Discharge: HOME | End: 2019-06-04
Payer: COMMERCIAL

## 2019-06-04 PROCEDURE — 90853 GROUP PSYCHOTHERAPY: CPT

## 2019-06-04 PROCEDURE — 90853 GROUP PSYCHOTHERAPY: CPT | Performed by: COUNSELOR

## 2019-06-04 NOTE — BH NOTE
Group Therapy Note    Date: 6/4/2019  Start Time: 1:15pm  End Time:  2:20pm  Number of Participants: 2    Type of Group: Art Therapy    Patient's Goal:  Pts were encouraged to engage in a value study; identifying their top 5 most important values and creating small drawings identifying ways they honor said values in their life. Pts were encouraged to share their drawings with their group peers upon completion. Notes:  Keily Blanca displayed positive ability to engage in the art therapy directive and group discussion. He was able to identify his top 5 values and share his drawings/values with the group. Keily Blanca was able to recognize the effects of substance use on his values, as well as his limited ability to honor his values in positive ways when engaged in drinking behaviors. Status After Intervention:  Improved    Participation Level:  Active Listener and Interactive    Participation Quality: Appropriate, Attentive, Sharing and Supportive      Speech:  normal      Thought Process/Content: Logical  Linear      Affective Functioning: Congruent      Mood: depressed      Level of consciousness:  Alert, Oriented x4 and Attentive      Response to Learning: Able to verbalize current knowledge/experience, Able to verbalize/acknowledge new learning, Able to retain information and Progressing to goal      Endings: None Reported    Modes of Intervention: Education, Support, Exploration, Clarifying, Problem-solving, Activity and Limit-setting      Discipline Responsible: Psychoeducational Specialist      Signature:  Shon Acosta MS, ATR-BC

## 2019-06-04 NOTE — GROUP NOTE
Group Therapy Note    Date: June 4    Group Start Time:  8:30 AM  Group End Time:  9:45 AM  Group Topic: Psychotherapy    [facility]    Trinh Haider, [facility]        Group Therapy Note    Attendees: 5         Patient's Goal:  Pt's goal was to listen to others. Notes:  Pt was engaged in group. Pt shared that he lost his job because of his drinking. Pt shared that he has not drank alcohol in a week and it feels good. Pt was able to list thing he has done rather than use alcohol. Status After Intervention:  Unchanged    Participation Level:  Active Listener and Interactive    Participation Quality: Appropriate and Attentive      Speech:  normal      Thought Process/Content: Logical  Linear      Affective Functioning: Congruent      Mood: depressed      Level of consciousness:  Alert, Oriented x4 and Attentive      Response to Learning: Able to verbalize current knowledge/experience and Progressing to goal      Endings: None Reported    Modes of Intervention: Education, Support, Socialization, Exploration, Clarifying, Problem-solving, Activity and Movement      Discipline Responsible: /Counselor      Signature:  Trinh Haider, [facility]

## 2019-06-04 NOTE — GROUP NOTE
Group Therapy Note    Date: June 4    Group Start Time: 11:15 AM  Group End Time: 12:15 PM  Group Topic: Relapse Prevention    [facility]    Pia Llanos, [facility]        Group Therapy Note    Attendees: 5         Patient's Goal:  Patient will complete worksheet on boundaries and will discuss how they apply to mental wellbeing. Notes:  Patient engaged well in group. Completed the worksheet and discussed in group. He talked about his improvement in affect lately and contributes it to finally taking responsibility for how he feels. Received feedback from peers. Status After Intervention:  Improved    Participation Level:  Active Listener and Interactive    Participation Quality: Appropriate and Attentive    Speech:  normal    Thought Process/Content: Logical    Affective Functioning: Congruent    Mood: anxious, elevated     Level of consciousness:  Oriented x4    Response to Learning: Able to verbalize current knowledge/experience and Able to verbalize/acknowledge new learning    Endings: None Reported    Modes of Intervention: Education, Support, Socialization and Exploration    Discipline Responsible: /Counselor    Signature:  Pia Llanos, [facility]

## 2019-06-04 NOTE — GROUP NOTE
Group Therapy Note    Date: June 4    Group Start Time: 10:00 AM  Group End Time: 11:00 AM  Group Topic: Cognitive Skills    [facility]    DIONI Mortensen        Group Therapy Note    Attendees: 5    Patient's Goal: To decrease anxiety and increase mindfulness and relaxation on mindfulness outdoor walk. Notes: Pt engaged in group activity. Pt shared she felt less anxious after taking the walk. Pts encouraged to focus on one thing at a time.      Status After Intervention:  Unchanged    Participation Level: Minimal    Participation Quality: Appropriate and Attentive      Speech:  normal      Thought Process/Content: Logical      Affective Functioning: Congruent      Mood: anxious and depressed      Level of consciousness:  Alert and Oriented x4      Response to Learning: Able to retain information and Capable of insight      Endings: None Reported    Modes of Intervention: Education, Support, Socialization and Activity      Discipline Responsible: Psychoeducational Specialist      Signature:  Angel Rodgers MA, CTRS

## 2019-06-06 ENCOUNTER — HOSPITAL ENCOUNTER (OUTPATIENT)
Dept: PSYCHIATRY | Age: 44
Setting detail: THERAPIES SERIES
Discharge: HOME | End: 2019-06-06
Payer: COMMERCIAL

## 2019-06-06 DIAGNOSIS — F33.2 SEVERE EPISODE OF RECURRENT MAJOR DEPRESSIVE DISORDER, WITHOUT PSYCHOTIC FEATURES (HCC): ICD-10-CM

## 2019-06-06 PROCEDURE — 90853 GROUP PSYCHOTHERAPY: CPT

## 2019-06-06 PROCEDURE — 90853 GROUP PSYCHOTHERAPY: CPT | Performed by: COUNSELOR

## 2019-06-06 NOTE — BH NOTE
Group Therapy Note    Date: 6/6/2019  Start Time: 1:15pm  End Time:  2:20pm  Number of Participants: 4     Type of Group: Art Therapy     Patient's Goal:  Pts were directed to engage in creating personal identity collages using magazine words and images. Pts were encouraged to respond intuitively to images and words found to articulate themselves through the art process. Pts will have an opportunity to share their collages with the group during next art therapy group session.      Notes:  Kaden Tamayo displayed positive ability to remain focused and engaged in both the collage process and group interpersonal interactions. He was able to offer support to others and was able to use active listening skills appropriately in group. His affect remains dysthymic but the words chosen for his collage were mostly future-oriented and hopeful. Topics focused on included: love languages, relationship challenges and getting personal needs met in a healthy manner. Status After Intervention:  Improved    Participation Level:  Active Listener and Interactive    Participation Quality: Appropriate, Attentive, Sharing and Supportive      Speech:  normal      Thought Process/Content: Logical  Linear  Perseverating      Affective Functioning: Flat      Mood: dysphoric      Level of consciousness:  Alert, Oriented x4 and Attentive      Response to Learning: Able to verbalize current knowledge/experience, Able to verbalize/acknowledge new learning, Able to retain information and Progressing to goal      Endings: None Reported    Modes of Intervention: Education, Support, Exploration, Clarifying, Problem-solving, Activity and Limit-setting      Discipline Responsible: Psychoeducational Specialist      Signature:  Ahsan Orlando MS, ATR-BC

## 2019-06-06 NOTE — GROUP NOTE
Group Therapy Note    Date: June 6    Group Start Time: 11:15 AM  Group End Time: 12:15 PM  Group Topic: Relapse Prevention    [facility]    Ryan Guan [facility]        Group Therapy Note    Attendees: 6         Patient's Goal:  Patient will complete worksheet on Shame and Codependency Symptoms. Will discuss how these negatively impact mental health and how to overcome these symptoms. Notes:  Patient engaged well in group. Completed worksheet and discussed. He talked about tendency to run from strong emotions and numb them with alcohol. Explored ways to begin dealing with his feelings. Status After Intervention:  Improved    Participation Level:  Active Listener    Participation Quality: Appropriate, Attentive and Sharing      Speech:  normal      Thought Process/Content: Logical      Affective Functioning: Congruent      Mood: anxious and depressed      Level of consciousness:  Oriented x4      Response to Learning: Able to verbalize current knowledge/experience      Endings: None Reported    Modes of Intervention: Education, Support, Socialization and Exploration      Discipline Responsible: /Counselor      Signature:  Ryan Guan [facility]

## 2019-06-06 NOTE — GROUP NOTE
Group Therapy Note    Date: June 6    Group Start Time:  8:45 AM  Group End Time:  9:45 AM  Group Topic: Psychotherapy    [facility]      Group Therapy Note    Attendees: 5    Patient shared and set a goal at the beginning of group to practice a new way of being in group today. Notes: Pt set goal to \"listen and support others\". Pt appeared to meet goal during group and he shared that he is 9 days sober and in the process of getting a divorce from wife. Pt seemed to relate and connect with peers in group re: managing anxiety and stressors. Status After Intervention:  Improved    Participation Level:  Active Listener and Interactive    Participation Quality: Appropriate, Attentive and Sharing      Speech:  normal      Thought Process/Content: Logical  Linear      Affective Functioning: Flat and Constricted/Restricted      Mood: anxious and depressed      Level of consciousness:  Alert and Oriented x4      Response to Learning: Able to verbalize current knowledge/experience, Able to verbalize/acknowledge new learning and Able to retain information      Endings: None Reported    Modes of Intervention: Education, Support, Socialization, Exploration and Clarifying      Discipline Responsible: /Counselor      Signature:  SHAQUILLE Brown R-DMT

## 2019-06-10 NOTE — DISCHARGE SUMMARY
[address]                 [address] [facility]                               DISCHARGE SUMMARY    PATIENT NAME: Linda Golden                        :        1975  MED REC NO:   [id number]                          ROOM:       8400  ACCOUNT NO:   [de-identified]                           ADMIT DATE: 2019  PROVIDER:     Sruthi Hardin. Drew Mann MD                  DISCHARGE DATE:  2019    DISPOSITION:  The patient will be discharged home. Follow up in  outpatient services through [facility], [facility]. MEDICATIONS AT DISCHARGE:  Trazodone 50 mg at night, fenofibrate 160 mg  daily, Prozac 20 mg daily, Avapro 150 mg daily, Singulair _____ mg at  night. CONDITION AT DISCHARGE:  Stable. MENTAL STATUS:  The patient is alert and oriented to person, place, and  time. No threats to harm self or others. No psychotic symptoms. Insight and judgment improved. DISCHARGE DIAGNOSES:  AXIS I:  1. Alcohol intoxication without complication. 2.  Alcohol abuse. 3.  Major depressive episode, recurrent, nonpsychotic, severe. AXIS II:  Deferred. AXIS III:  Osteoarthritis. AXIS IV:  Moderate. AXIS V:  55. CHIEF COMPLAINT:  Suicidality and intoxication. HISTORY:  A 46-year-old male presented with alcohol abuse. He states  that he feels like everything is out of control. He and his wife have  been living together for 17 years and he describes her as being very  supportive. He was drinking yesterday and got into a verbal  altercation. 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 Wife is supportive and wants to  get him help. She states she is not allowing him to return to the home,  as he is going to stay with his father. The patient was hospitalized  and did well. He showed no major withdrawal issues during his stay.   He  is very cooperative and pleasant and engaged rather easily. He did  benefit from program overall. He states he was serious about work  through [facility] and other outpatient services through [facility].         Quentin Gardiner MD    D: 06/09/2019 21:56:09       T: 06/10/2019 3:39:34     [id number]  Job#: [id number]     Doc#: [id number]    CC:

## 2019-06-11 ENCOUNTER — HOSPITAL ENCOUNTER (OUTPATIENT)
Dept: PSYCHIATRY | Age: 44
Setting detail: THERAPIES SERIES
Discharge: HOME | End: 2019-06-11
Payer: COMMERCIAL

## 2019-06-11 DIAGNOSIS — F33.2 SEVERE EPISODE OF RECURRENT MAJOR DEPRESSIVE DISORDER, WITHOUT PSYCHOTIC FEATURES (HCC): ICD-10-CM

## 2019-06-11 PROCEDURE — 90853 GROUP PSYCHOTHERAPY: CPT | Performed by: COUNSELOR

## 2019-06-11 PROCEDURE — 90853 GROUP PSYCHOTHERAPY: CPT

## 2019-06-11 NOTE — GROUP NOTE
Group Therapy Note    Date: June 11    Group Start Time:  8:35 AM  Group End Time:  9:45 AM  Group Topic: Psychotherapy    IVANA Hussein, [facility]      Group Therapy Note    Attendees: 6    Patient shared and set a goal at the beginning of group to practice a new way of being in group today. Notes: Pt was engaged and set goal \"listen and be supportive\". Pt appeared to meet goal as he engaged with peers and offered supportive feedback. Status After Intervention:  Improved    Participation Level:  Active Listener and Interactive    Participation Quality: Appropriate and Attentive      Speech:  normal      Thought Process/Content: Logical  Linear      Affective Functioning: Constricted/Restricted      Mood: anxious and depressed      Level of consciousness:  Alert and Oriented x4      Response to Learning: Able to verbalize current knowledge/experience, Able to verbalize/acknowledge new learning and Able to retain information      Endings: None Reported    Modes of Intervention: Education, Support, Socialization, Exploration and Clarifying      Discipline Responsible: /Counselor      Signature:  SHAQUILLE Khoury, KATHARINE

## 2019-06-11 NOTE — GROUP NOTE
Group Therapy Note    Date: June 11    Group Start Time: 10:00 AM  Group End Time: 11:00 AM  Group Topic: Psychoeducation    [facility]    Renée Bentley        Group Therapy Note    Attendees: 6  Group Topic: Group members were encouraged to engage in a group discussion focusing on aspects of life they may \"need to let go of\". Pts engaged in listening to a reading from the \"Until Today\" book and were encouraged to write notes and offer verbal responses to the reading. Notes:  Janessa Magaña displayed positive ability to engage in active listening while others shared their thoughts in group. He was able to identify that he is learning to let go of his marriage and his learning how to accept new roles with his kids and is working on letting go of his past identity. Status After Intervention:  Improved    Participation Level:  Active Listener and Interactive    Participation Quality: Appropriate and Attentive      Speech:  hesitant      Thought Process/Content: Logical  Linear  Perseverating      Affective Functioning: Flat      Mood: anxious and dysphoric      Level of consciousness:  Alert, Oriented x4 and Attentive      Response to Learning: Able to verbalize current knowledge/experience, Able to verbalize/acknowledge new learning, Able to retain information and Progressing to goal      Endings: None Reported    Modes of Intervention: Education, Support, Exploration, Clarifying, Problem-solving, Activity and Limit-setting      Discipline Responsible: Psychoeducational Specialist      Signature:  Jaspal Day MS, ATR-BC

## 2019-06-11 NOTE — GROUP NOTE
Group Therapy Note    Date: June 11    Group Start Time: 11:15 AM  Group End Time: 12:15 PM  Group Topic: Relapse Prevention    [facility]    Huy Pereira [facility]        Group Therapy Note    Attendees: 6         Patient's Goal:  Patient will complete worksheet on Stress Management 101. Will discuss how coping skills improve overall mood and decrease stress. Notes:  Patient engaged well in group. Completed the worksheet and discussed. He talked about the changes that have been occurring in his life and the coping skills he has been using to cope with the stress in his life. Reported that his hsey has been getting stronger as he relies more heavily on it to cope with change. Status After Intervention:  Improved    Participation Level:  Active Listener and Interactive    Participation Quality: Appropriate, Attentive, Sharing and Supportive      Speech:  normal      Thought Process/Content: Logical  Linear      Affective Functioning: Congruent      Mood: anxious and depressed      Level of consciousness:  Oriented x4      Response to Learning: Able to verbalize current knowledge/experience      Endings: None Reported    Modes of Intervention: Education, Support, Socialization and Exploration      Discipline Responsible: /Counselor      Signature:  Huy Pereira, [facility]

## 2019-06-11 NOTE — BH NOTE
Discharge Planning is Complete.     Discharge Date: 06/14/2019  Discharge Diagnosis: MAjor Depression, severe Recurrent nonpsychotic     Your instructions: Your provider here was Dr. Almaz Robin. If you have any questions, please do not hesitate to call the outpatient office at [phone].     Please note that we have a patient family advisory [place] that meets the second Wednesday of January and the second Wednesday of July AT 5pm in [place] at [facility]. Department leadership would love for you to attend to give feedback on what we are doing well and areas in which we can improve our patient care. Please come if you are able and feel free to reach out to [facility] at [phone] with any questions.      The crisis number for [facility] is [phone] (SAVE). This crisis line is available 24 hours a day, seven days a week.     You did complete a safety plan and a copy has been provided to you.        Your next appointment is:  Name of Provider: Darrell   Date and time of appointment: 06/18/2019 at 0800. The type/s of services requested are: medication management, substance abuse counseling . Agency name: [employer]   Address: [address] [facility]  Phone Number: [phone]  Special instructions (what to bring to appointment, etc.): Please bring 3 forms of identification with you (ex. Piece of mail with your name and address, Photo ID, Birth certificate, prescription bottle). [employer] [facility] will provide transportation the first 3 days. Please contact them the day before your appointment to schedule this. They will refer you to case management for further assistance after the initial 3 visits. Bring with you the completed new patient paperwork that was provided to you.         We also want to express the availability of Bridge Appointments here at [facility].   If you are connecting to community services and you find you have a long wait time until you can see your new psychiatrist, therapist, or primary care physician, know that we can provide health care services to you in the time between your care here and the beginning of your care with your new providers. You must have psychiatry and therapy appointments scheduled to be seen for medicine management.     If you need a Bridge Appointment, please call the office at [phone].        Created by: Padmini Lopez RN-BC, BSN.     Fax to: Provider name and Number   [facility]-[phone]

## 2019-06-11 NOTE — BH NOTE
Group Therapy Note    Date: 6/11/2019  Start Time: 1:15pm  End Time:  2:15pm  Number of Participants: 4    Type of Group: Art Therapy    Patient's Goal:  Pts were directed to engage in completing a PPAT art therapy assessment (Person Picking an Apple from a Tree). Pts also engaged in discussing their drawings. Topics focused on included: goal orientation, social supports and problem solving life challenges. Notes:  Danny's drawing indicated a difficulty with motivation. He was able to recognize this limitation but seemed to be struggling with remaining mentally present in group. He received prompts from fellow group members to engage in group discussion and was resistant to the idea. Status After Intervention:  Unchanged    Participation Level:  Active Listener and Interactive    Participation Quality: Appropriate, Attentive and Resistant      Speech:  hesitant      Thought Process/Content: Perseverating      Affective Functioning: Flat      Mood: dysphoric      Level of consciousness:  Alert, Oriented x4 and Attentive      Response to Learning: Able to verbalize current knowledge/experience, Able to verbalize/acknowledge new learning, Able to retain information and Progressing to goal      Endings: None Reported    Modes of Intervention: Education, Support, Exploration, Clarifying, Problem-solving, Activity and Limit-setting      Discipline Responsible: Psychoeducational Specialist      Signature:  Grey Leon MS, ATR-BC

## 2019-06-13 ENCOUNTER — HOSPITAL ENCOUNTER (OUTPATIENT)
Dept: PSYCHIATRY | Age: 44
Setting detail: THERAPIES SERIES
Discharge: HOME | End: 2019-06-13
Payer: COMMERCIAL

## 2019-06-13 DIAGNOSIS — F33.2 SEVERE EPISODE OF RECURRENT MAJOR DEPRESSIVE DISORDER, WITHOUT PSYCHOTIC FEATURES (HCC): ICD-10-CM

## 2019-06-13 PROCEDURE — 90853 GROUP PSYCHOTHERAPY: CPT

## 2019-06-13 PROCEDURE — 90853 GROUP PSYCHOTHERAPY: CPT | Performed by: COUNSELOR

## 2019-06-13 NOTE — GROUP NOTE
Group Therapy Note    Date: June 13    Group Start Time: 10:00 AM  Group End Time: 11:00 AM  Group Topic: Cognitive Skills    [facility]    Renée Bentley        Group Therapy Note    Attendees: 8    Group Topic: Group members were encouraged to create support tokens for each of their group members/peers. Each group member identified a personal need at the beginning of group and members were encouraged to focus on these needs when creating their support tokens. Notes:  Wendy Wyatt displayed positive ability to identify his need as strength (including hope and shey). He was able to receive and accept tokens from others in an optimistic manner and displayed good ability to remain present in the moment for the directive and duration of group. Status After Intervention:  Improved    Participation Level:  Active Listener and Interactive    Participation Quality: Appropriate, Attentive, Sharing and Supportive      Speech:  normal      Thought Process/Content: Logical  Linear      Affective Functioning: Constricted/Restricted      Mood: anxious and depressed  (decreased)    Level of consciousness:  Alert, Oriented x4 and Attentive      Response to Learning: Able to verbalize current knowledge/experience, Able to verbalize/acknowledge new learning, Able to retain information and Progressing to goal      Endings: None Reported    Modes of Intervention: Education, Support, Exploration, Clarifying, Problem-solving, Activity and Limit-setting      Discipline Responsible: Psychoeducational Specialist      Signature:  Tejinder Locke MS, ATR-BC

## 2019-06-13 NOTE — BH NOTE
Group Therapy Note    Date: 6/13/2019  Start Time: 115pm  End Time:  220pm  Number of Participants: 4    Type of Group: Art Therapy    Patient's Goal:  Pts were directed to complete their personal identity collage, using magazine images and words to describe their sense of self. Pts were encouraged to discuss their collage upon completion; topics focused on included: personal identity, personal self-worth and life events that challenge a healthy sense of self. Notes:  Manpreet Harvey displayed positive ability to remain engaged in the directive for the duration of group. He was able to share his collage with the group, gaining feedback and support from others. He also chose to change elements of his collage as a result of reflecting gained through reflective distancing. He removed the word \"TrainWreck\" from the center of the collage and when the \"W\" was left placed the letters \"IN\" after it to create \"WIN\". Manpreet Harvey seemed hopeful about his continued treatment following discharge from the program and was thankful for the support received here. Status After Intervention:  Improved    Participation Level:  Active Listener and Interactive    Participation Quality: Appropriate, Attentive, Sharing and Supportive      Speech:  normal      Thought Process/Content: Logical  Linear      Affective Functioning: Constricted/Restricted      Mood: anxious and depressed (decreased)      Level of consciousness:  Alert, Oriented x4 and Attentive      Response to Learning: Able to verbalize current knowledge/experience, Able to verbalize/acknowledge new learning, Able to retain information, Capable of insight and Progressing to goal      Endings: None Reported    Modes of Intervention: Education, Support, Exploration, Clarifying, Problem-solving, Activity and Limit-setting      Discipline Responsible: Psychoeducational Specialist      Signature:  Grey Leon MS, ATR-BC

## 2019-06-13 NOTE — GROUP NOTE
Group Therapy Note    Date: June 13    Group Start Time:  8:35 AM  Group End Time:  9:45 AM  Group Topic: Psychotherapy    IVANA Barry, [facility]      Group Therapy Note    Attendees: 8    Patient shared and set a goal at the beginning of group to practice a new way of being in group today. Notes:  Pt set goal to [facility] and be in the moment\". Pt was engaged in group and appeared to listen to peers during group as he offered some supportive feedback and engaged in eye contact throughout groups. Status After Intervention:  Improved    Participation Level:  Active Listener and Interactive    Participation Quality: Appropriate and Attentive      Speech:  normal      Thought Process/Content: Logical  Linear      Affective Functioning: Flat and Constricted/Restricted      Mood: depressed      Level of consciousness:  Alert and Oriented x4      Response to Learning: Able to verbalize current knowledge/experience, Able to verbalize/acknowledge new learning and Able to retain information      Endings: None Reported    Modes of Intervention: Education, Support, Socialization, Exploration and Clarifying      Discipline Responsible: /Counselor      Signature:  NAMAN Beltran-S, R-MELINAT

## 2019-06-14 ENCOUNTER — HOSPITAL ENCOUNTER (OUTPATIENT)
Dept: PSYCHIATRY | Age: 44
Setting detail: THERAPIES SERIES
Discharge: HOME | End: 2019-06-14
Payer: COMMERCIAL

## 2019-06-14 DIAGNOSIS — F33.2 SEVERE EPISODE OF RECURRENT MAJOR DEPRESSIVE DISORDER, WITHOUT PSYCHOTIC FEATURES (HCC): ICD-10-CM

## 2019-06-14 PROCEDURE — 90853 GROUP PSYCHOTHERAPY: CPT

## 2019-06-14 NOTE — BH NOTE
Discharge instructions reviewed with patient, verbalized understanding. AVS faxed to [phone] [facility].

## 2019-06-14 NOTE — BH NOTE
Group Therapy Note    Date: June 14    Group Start Time: 1:15 pm  Group End Time: 2:15 pm  Group Topic: Psychoeducation    [address]    [facility] [place]        Group Therapy Note    Attendees: 3         Patient's Goal:  Patients received a handout on the Window of Tolerance and coping with anxiety and stress. Patients discussed how negative core beliefs influence thinking and behavior and were encouraged to practice mindfulness and affirmations to self-soothe and stay within the emotionally regulated comfort zone. Lastly, patients were encouraged to identify affirmations through art making and/or writing. Notes:  Pilo Kilpatrick read along with the handout on the Window of Tolerance, engaged in group discussion and participated in art making, stating he had drawn himself, \"walking towards [facility]. I start treatment there on Tuesday. \" Pilo Kilpatrick reported to therapist that \"my leg monitor was acting up this morning, but I haven't been drinking for 17 days. I'm worried it might be a false positive and I talked to the nurse about it, but she couldn't locate a breathalyzer. \" Pilo Kilpatrick appeared sober and an active participant during group as well as throughout programming that day, per his other counselors. Pilo Kilpatrick identified he was going to continue to call his  to make sure his leg monitor was still functioning and created a plan for the weekend to help him structure his time until treatment at [facility] began Tuesday. Status After Intervention:  Unchanged    Participation Level:  Active Listener and Interactive    Participation Quality: Appropriate, Attentive, Sharing and Supportive      Speech:  normal      Thought Process/Content: Logical      Affective Functioning: Blunted      Mood: anxious      Level of consciousness:  Alert, Oriented x4 and Attentive      Response to Learning: Able to verbalize current knowledge/experience, Able to verbalize/acknowledge new learning, Able to retain information and Capable of insight      Endings: None Reported    Modes of Intervention: Education, Support, Socialization and Exploration      Discipline Responsible: Psychoeducational Specialist      Signature:  Sarai Tolentino

## 2019-06-14 NOTE — GROUP NOTE
Group Therapy Note    Date: June 14    Group Start Time:  8:40 AM  Group End Time:  9:50 AM  Group Topic: Psychotherapy    [facility]    Chelsey James 55        Group Therapy Note    Attendees: 6    Patient shared and set a goal at the beginning of group to practice a new way of being in group today. Notes:  Pt set goal to \"be supportive\" of peers in group. Pt appeared to meet goal during group as he was encouraging peers and reflected on his progress in treatment as today is his last day. Status After Intervention:  Improved    Participation Level:  Active Listener and Interactive    Participation Quality: Appropriate and Attentive      Speech:  normal      Thought Process/Content: Logical  Linear      Affective Functioning: Congruent      Mood: euthymic      Level of consciousness:  Alert and Oriented x4      Response to Learning: Able to verbalize current knowledge/experience, Able to verbalize/acknowledge new learning and Able to retain information      Endings: None Reported    Modes of Intervention: Education, Support, Socialization, Exploration and Clarifying      Discipline Responsible: /Counselor      Signature:  SHAQUILLE James, KATHARINE

## 2019-06-14 NOTE — GROUP NOTE
Group Therapy Note    Date: June 14    Group Start Time: 10:00 AM  Group End Time: 11:00 AM  Group Topic: Cognitive Skills    [facility]    RADHA Rascon        Group Therapy Note    Attendees: 6         Focused on DEAR MAN, setting healthy boundaries and stopping negative thoughts. Notes: Royer Molina was very attentive and participated in group. He had to leave group to take care of a phone call and was not able to return to the rest of group. Status After Intervention:  Improved    Participation Level: Active Listener and Interactive    Participation Quality: Appropriate and Attentive      Speech:  normal      Thought Process/Content: Logical      Affective Functioning: Constricted/Restricted      Mood: anxious and depressed      Level of consciousness:  Alert      Response to Learning: Able to verbalize current knowledge/experience, Able to verbalize/acknowledge new learning and Able to retain information      Endings: None Reported    Modes of Intervention: Education, Socialization, Exploration, Clarifying and Problem-solving      Discipline Responsible: /Counselor      Signature:   RADHA Rascon

## 2019-06-14 NOTE — GROUP NOTE
Group Therapy Note    Date: June 14    Group Start Time: 11:15 AM  Group End Time: 12:15 PM  Group Topic: Cognitive Skills    [facility]    DIONI Mari        Group Therapy Note    Attendees: 6    Patient's Goal: To complete a Maslow's Hierarchy of Needs template, identifying needs in each area. Notes: Pt engaged in group discussion and activity. Pt completed the template. Group discussed motivation and prioritizing needs. Status After Intervention:  Improved    Participation Level:  Active Listener and Interactive    Participation Quality: Appropriate, Attentive and Sharing      Speech:  normal      Thought Process/Content: Logical      Affective Functioning: Congruent      Mood: euthymic      Level of consciousness:  Alert and Oriented x4      Response to Learning: Able to retain information and Capable of insight      Endings: None Reported    Modes of Intervention: Education, Support, Socialization and Activity      Discipline Responsible: Psychoeducational Specialist      Signature:  Jose Alfredo Castro MA, CTRS

## 2019-06-18 ENCOUNTER — CLINICAL DOCUMENTATION (OUTPATIENT)
Dept: SPIRITUAL SERVICES | Age: 44
End: 2019-06-18

## 2019-06-18 ENCOUNTER — HOSPITAL ENCOUNTER (OUTPATIENT)
Dept: PSYCHIATRY | Age: 44
Setting detail: THERAPIES SERIES
End: 2019-06-18
Payer: COMMERCIAL

## 2019-06-18 NOTE — FLOWSHEET NOTE
Followed up with Pt for Outpatient Spiritual Care support. Pt stated it was not a good time, but would like follow up again at a later time. Assured him that we would attempt to do so. Marybel Hopson   Associate       06/18/19 2151   Encounter Summary   Services provided to: Patient   Referral/Consult From: Rounding   Continue Visiting Yes  (6/18 follow up [facility], busy, asked to call back)   Complexity of Encounter Low   Length of Encounter 15 minutes   Routine   Type Initial   Assessment Approachable   Intervention Active listening   Outcome Expressed gratitude; Refused/declined;Receptive

## 2019-06-20 ENCOUNTER — HOSPITAL ENCOUNTER (OUTPATIENT)
Dept: PSYCHIATRY | Age: 44
Setting detail: THERAPIES SERIES
Discharge: HOME | End: 2019-06-20
Payer: COMMERCIAL

## 2019-06-20 ENCOUNTER — APPOINTMENT (OUTPATIENT)
Dept: PSYCHIATRY | Age: 44
End: 2019-06-20
Payer: COMMERCIAL

## 2019-06-22 ENCOUNTER — CLINICAL DOCUMENTATION (OUTPATIENT)
Dept: SPIRITUAL SERVICES | Age: 44
End: 2019-06-22

## 2019-06-22 NOTE — FLOWSHEET NOTE
Followed up with Pt for Outpatient Spiritual Care support. Pt busy with his family at this time and asked  to call back on Tuesday, June 25. Assured him that we would attempt to do so. [id number]       06/22/19 2803   Encounter Summary   Services provided to: Patient   Referral/Consult From: Rounding   Continue Visiting Yes  (6/22 follow up [facility], busy w/ family, call back Tues)   Complexity of Encounter Low   Length of Encounter 15 minutes   Routine   Type Follow up   Assessment Calm   Intervention Active listening   Outcome Expressed gratitude; Refused/declined;Receptive

## 2019-06-25 ENCOUNTER — CLINICAL DOCUMENTATION (OUTPATIENT)
Dept: SPIRITUAL SERVICES | Age: 44
End: 2019-06-25

## 2019-07-21 ENCOUNTER — CLINICAL DOCUMENTATION (OUTPATIENT)
Dept: SPIRITUAL SERVICES | Age: 44
End: 2019-07-21

## 2019-08-02 ENCOUNTER — CLINICAL DOCUMENTATION (OUTPATIENT)
Dept: SPIRITUAL SERVICES | Age: 44
End: 2019-08-02

## 2019-08-24 ENCOUNTER — CLINICAL DOCUMENTATION (OUTPATIENT)
Dept: SPIRITUAL SERVICES | Age: 44
End: 2019-08-24

## 2019-09-17 ENCOUNTER — CLINICAL DOCUMENTATION (OUTPATIENT)
Dept: SPIRITUAL SERVICES | Age: 44
End: 2019-09-17

## 2019-09-20 ENCOUNTER — CLINICAL DOCUMENTATION (OUTPATIENT)
Dept: SPIRITUAL SERVICES | Age: 44
End: 2019-09-20

## 2019-10-11 ENCOUNTER — CLINICAL DOCUMENTATION (OUTPATIENT)
Dept: SPIRITUAL SERVICES | Age: 44
End: 2019-10-11

## 2019-10-15 ENCOUNTER — CLINICAL DOCUMENTATION (OUTPATIENT)
Dept: SPIRITUAL SERVICES | Age: 44
End: 2019-10-15

## 2022-11-07 NOTE — GROUP NOTE
Group Therapy Note    Date: May 10    Group Start Time: 1000  Group End Time: [address] [facility]  Group Topic: Psychoeducation    [address] [facility], [address]        Group Therapy Note    Attendees: 9    Patient's Goal: To practice gratitude while completing a mandala identifying things one is grateful for. Notes: Pt engaged in group activity. Pt shared things he is grateful for with the group members. Status After Intervention:  Improved    Participation Level:  Active Listener    Participation Quality: Appropriate, Attentive and Sharing      Speech:  normal      Thought Process/Content: Logical      Affective Functioning: Flat      Mood: anxious and depressed      Level of consciousness:  Alert and Oriented x4      Response to Learning: Able to retain information and Capable of insight      Endings: None Reported    Modes of Intervention: Education, Support, Socialization and Activity      Discipline Responsible: Psychoeducational Specialist      Signature:  Anaya Carroll MA, CTRS 97.6